# Patient Record
Sex: FEMALE | Race: BLACK OR AFRICAN AMERICAN | HISPANIC OR LATINO | Employment: OTHER | URBAN - METROPOLITAN AREA
[De-identification: names, ages, dates, MRNs, and addresses within clinical notes are randomized per-mention and may not be internally consistent; named-entity substitution may affect disease eponyms.]

---

## 2018-03-27 LAB
ABSOL LYMPHOCYTES (HISTORICAL): 1.9 K/UL (ref 0.5–4)
ALBUMIN SERPL BCP-MCNC: 4.2 G/DL (ref 3–5.2)
ALP SERPL-CCNC: 83 U/L (ref 43–122)
ALT SERPL W P-5'-P-CCNC: 38 U/L (ref 9–52)
ANION GAP SERPL CALCULATED.3IONS-SCNC: 8 MMOL/L (ref 5–14)
AST SERPL W P-5'-P-CCNC: 24 U/L (ref 14–36)
BASOPHILS # BLD AUTO: 0 K/UL (ref 0–0.1)
BASOPHILS # BLD AUTO: 1 % (ref 0–1)
BILIRUB SERPL-MCNC: 0.6 MG/DL
BUN SERPL-MCNC: 14 MG/DL (ref 5–25)
CALCIUM SERPL-MCNC: 10.3 MG/DL (ref 8.4–10.2)
CHLORIDE SERPL-SCNC: 106 MEQ/L (ref 97–108)
CHOLEST SERPL-MCNC: 189 MG/DL
CHOLEST/HDLC SERPL: 3.3 {RATIO}
CO2 SERPL-SCNC: 30 MMOL/L (ref 22–30)
CREATINE, SERUM (HISTORICAL): 0.61 MG/DL (ref 0.6–1.2)
DEPRECATED RDW RBC AUTO: 14 %
EGFR (HISTORICAL): >60 ML/MIN/1.73 M2
EOSINOPHIL # BLD AUTO: 0.1 K/UL (ref 0–0.4)
EOSINOPHIL NFR BLD AUTO: 1 % (ref 0–6)
GLUCOSE SERPL-MCNC: 78 MG/DL (ref 70–99)
HCT VFR BLD AUTO: 44.6 % (ref 36–46)
HDLC SERPL-MCNC: 57 MG/DL
HGB BLD-MCNC: 14.9 G/DL (ref 12–16)
LDL/HDL RATIO (HISTORICAL): 2.1
LDLC SERPL CALC-MCNC: 120 MG/DL
LYMPHOCYTES NFR BLD AUTO: 29 % (ref 25–45)
MCH RBC QN AUTO: 32.6 PG (ref 26–34)
MCHC RBC AUTO-ENTMCNC: 33.3 % (ref 31–36)
MCV RBC AUTO: 98 FL (ref 80–100)
MONOCYTES # BLD AUTO: 0.3 K/UL (ref 0.2–0.9)
MONOCYTES NFR BLD AUTO: 4 % (ref 1–10)
NEUTROPHILS ABS COUNT (HISTORICAL): 4.4 K/UL (ref 1.8–7.8)
NEUTS SEG NFR BLD AUTO: 65 % (ref 45–65)
PLATELET # BLD AUTO: 219 K/MCL (ref 150–450)
POTASSIUM SERPL-SCNC: 4.5 MEQ/L (ref 3.6–5)
RBC # BLD AUTO: 4.56 M/MCL (ref 4–5.2)
SODIUM SERPL-SCNC: 144 MEQ/L (ref 137–147)
TOTAL PROTEIN (HISTORICAL): 7 G/DL (ref 5.9–8.4)
TRIGL SERPL-MCNC: 60 MG/DL
TSH SERPL DL<=0.05 MIU/L-ACNC: 0.94 UIU/ML (ref 0.47–4.68)
VLDLC SERPL CALC-MCNC: 12 MG/DL (ref 0–40)
WBC # BLD AUTO: 6.8 K/MCL (ref 4.5–11)

## 2018-03-28 LAB
HEPATITIS A IGM ANTIBODY (HISTORICAL): NORMAL
HEPATITIS B CORE IGM ANTIBODY (HISTORICAL): NORMAL
HEPATITIS B SURFACE AG CONFIRMATION (HISTORICAL): NORMAL
HEPATITIS C ANTIBODY (HISTORICAL): NORMAL

## 2020-01-01 ENCOUNTER — HOSPITAL ENCOUNTER (EMERGENCY)
Facility: HOSPITAL | Age: 55
Discharge: HOME | End: 2020-01-01
Attending: EMERGENCY MEDICINE | Admitting: EMERGENCY MEDICINE
Payer: MEDICARE

## 2020-01-01 VITALS
HEART RATE: 66 BPM | RESPIRATION RATE: 16 BRPM | SYSTOLIC BLOOD PRESSURE: 123 MMHG | TEMPERATURE: 96.6 F | DIASTOLIC BLOOD PRESSURE: 81 MMHG | WEIGHT: 146.8 LBS | OXYGEN SATURATION: 100 %

## 2020-01-01 DIAGNOSIS — R51.9 HEADACHE: Primary | ICD-10-CM

## 2020-01-01 DIAGNOSIS — J34.89 SINUS PAIN: ICD-10-CM

## 2020-01-01 LAB
BACTERIA UR QL AUTO: ABNORMAL /HPF
BILIRUB UR QL STRIP: NEGATIVE
CLARITY UR: CLEAR
COLOR UR: ABNORMAL
EXT PREG TEST URINE: NEGATIVE
EXT. CONTROL ED NAV: NORMAL
GLUCOSE UR STRIP-MCNC: NEGATIVE MG/DL
HGB UR QL STRIP.AUTO: 250
KETONES UR STRIP-MCNC: NEGATIVE MG/DL
LEUKOCYTE ESTERASE UR QL STRIP: NEGATIVE
NITRITE UR QL STRIP: NEGATIVE
NON-SQ EPI CELLS URNS QL MICRO: ABNORMAL /HPF
PH UR STRIP.AUTO: 7 [PH]
PROT UR STRIP-MCNC: NEGATIVE MG/DL
RBC #/AREA URNS AUTO: ABNORMAL /HPF
SP GR UR STRIP.AUTO: 1 (ref 1–1.04)
UROBILINOGEN UA: NEGATIVE MG/DL
WBC #/AREA URNS AUTO: ABNORMAL /HPF

## 2020-01-01 PROCEDURE — 96374 THER/PROPH/DIAG INJ IV PUSH: CPT

## 2020-01-01 PROCEDURE — 93005 ELECTROCARDIOGRAM TRACING: CPT

## 2020-01-01 PROCEDURE — 81025 URINE PREGNANCY TEST: CPT | Performed by: EMERGENCY MEDICINE

## 2020-01-01 PROCEDURE — 81001 URINALYSIS AUTO W/SCOPE: CPT | Performed by: EMERGENCY MEDICINE

## 2020-01-01 PROCEDURE — 96375 TX/PRO/DX INJ NEW DRUG ADDON: CPT

## 2020-01-01 PROCEDURE — 99284 EMERGENCY DEPT VISIT MOD MDM: CPT | Performed by: EMERGENCY MEDICINE

## 2020-01-01 PROCEDURE — 96361 HYDRATE IV INFUSION ADD-ON: CPT

## 2020-01-01 PROCEDURE — 99283 EMERGENCY DEPT VISIT LOW MDM: CPT

## 2020-01-01 RX ORDER — KETOROLAC TROMETHAMINE 30 MG/ML
30 INJECTION, SOLUTION INTRAMUSCULAR; INTRAVENOUS ONCE
Status: COMPLETED | OUTPATIENT
Start: 2020-01-01 | End: 2020-01-01

## 2020-01-01 RX ORDER — DIPHENHYDRAMINE HYDROCHLORIDE 50 MG/ML
25 INJECTION INTRAMUSCULAR; INTRAVENOUS ONCE
Status: COMPLETED | OUTPATIENT
Start: 2020-01-01 | End: 2020-01-01

## 2020-01-01 RX ORDER — FLUTICASONE PROPIONATE 50 MCG
1 SPRAY, SUSPENSION (ML) NASAL DAILY
Qty: 16 G | Refills: 0 | Status: SHIPPED | OUTPATIENT
Start: 2020-01-01

## 2020-01-01 RX ORDER — METOCLOPRAMIDE HYDROCHLORIDE 5 MG/ML
10 INJECTION INTRAMUSCULAR; INTRAVENOUS ONCE
Status: COMPLETED | OUTPATIENT
Start: 2020-01-01 | End: 2020-01-01

## 2020-01-01 RX ADMIN — SODIUM CHLORIDE 1000 ML: 0.9 INJECTION, SOLUTION INTRAVENOUS at 11:53

## 2020-01-01 RX ADMIN — KETOROLAC TROMETHAMINE 30 MG: 30 INJECTION, SOLUTION INTRAMUSCULAR; INTRAVENOUS at 11:51

## 2020-01-01 RX ADMIN — DIPHENHYDRAMINE HYDROCHLORIDE 25 MG: 50 INJECTION INTRAMUSCULAR; INTRAVENOUS at 11:51

## 2020-01-01 RX ADMIN — METOCLOPRAMIDE 10 MG: 5 INJECTION, SOLUTION INTRAMUSCULAR; INTRAVENOUS at 11:51

## 2020-01-01 NOTE — ED PROVIDER NOTES
History  Chief Complaint   Patient presents with    Headache     Patient reports pain and pressure in her head for 3 days     66-year-old female presents with complaint of a global headache  She reports that over the past 2-3 days she has had fluctuating symptoms  She had been feeling well up to at point reports that the change in weather, sinus congestion/pain along with headache  She is experience photophobia and phonophobia along with mild nausea  She denies any fever/chills, focal neurological deficits, or other acute medical concerns  She is noted to be hypertension on arrival but denies a past history of this  Headache   Pain location:  Generalized  Quality:  Dull  Radiates to:  Does not radiate  Onset quality:  Gradual  Timing:  Constant  Chronicity:  Recurrent  Similar to prior headaches: yes    Relieved by:  Nothing  Worsened by: Activity, light and sound  Ineffective treatments:  None tried  Associated symptoms: congestion, nausea and sinus pressure    Associated symptoms: no abdominal pain, no back pain, no blurred vision, no cough, no diarrhea, no dizziness, no drainage, no ear pain, no eye pain, no facial pain, no fatigue, no fever, no focal weakness, no hearing loss, no loss of balance, no myalgias, no near-syncope, no neck pain, no neck stiffness, no numbness, no paresthesias, no photophobia, no seizures, no sore throat, no swollen glands, no syncope, no tingling, no URI, no visual change, no vomiting and no weakness        None       Past Medical History:   Diagnosis Date    Psychiatric disorder        Past Surgical History:   Procedure Laterality Date     SECTION         History reviewed  No pertinent family history  I have reviewed and agree with the history as documented      Social History     Tobacco Use    Smoking status: Current Every Day Smoker     Packs/day: 1 00     Types: Cigarettes    Smokeless tobacco: Never Used   Substance Use Topics    Alcohol use: Not Currently    Drug use: Yes     Types: Marijuana     Comment: daily        Review of Systems   Constitutional: Negative for appetite change, chills, fatigue and fever  HENT: Positive for congestion, sinus pressure and sinus pain  Negative for ear pain, hearing loss, postnasal drip, sore throat and trouble swallowing  Eyes: Negative for blurred vision, photophobia, pain, redness and itching  Respiratory: Negative for cough, chest tightness, shortness of breath and wheezing  Cardiovascular: Negative for chest pain, leg swelling, syncope and near-syncope  Gastrointestinal: Positive for nausea  Negative for abdominal pain, constipation, diarrhea and vomiting  Endocrine: Negative  Genitourinary: Negative for difficulty urinating and dysuria  Musculoskeletal: Negative for back pain, myalgias, neck pain and neck stiffness  Skin: Negative for rash  Allergic/Immunologic: Negative  Neurological: Positive for headaches  Negative for dizziness, focal weakness, seizures, weakness, numbness, paresthesias and loss of balance  Hematological: Negative  Psychiatric/Behavioral: Negative  Physical Exam  Physical Exam   Constitutional: She is oriented to person, place, and time  She appears well-developed and well-nourished  HENT:   Head: Normocephalic and atraumatic  Right Ear: Tympanic membrane and external ear normal    Left Ear: Tympanic membrane and external ear normal    Nose: Mucosal edema present  No rhinorrhea  Mouth/Throat: Uvula is midline, oropharynx is clear and moist and mucous membranes are normal  No tonsillar exudate  Eyes: Pupils are equal, round, and reactive to light  Conjunctivae and EOM are normal    Neck: Normal range of motion  Neck supple  Cardiovascular: Normal rate, regular rhythm and normal heart sounds  Pulmonary/Chest: Effort normal and breath sounds normal  No respiratory distress  She has no wheezes  Abdominal: Soft   Bowel sounds are normal  There is no tenderness  There is no guarding  Musculoskeletal: She exhibits no edema, tenderness or deformity  Neurological: She is alert and oriented to person, place, and time  Skin: Skin is warm and dry  Capillary refill takes less than 2 seconds  No rash noted  Psychiatric: She has a normal mood and affect  Her behavior is normal    Nursing note and vitals reviewed        Vital Signs  ED Triage Vitals [01/01/20 1115]   Temperature Pulse Respirations Blood Pressure SpO2   (!) 96 6 °F (35 9 °C) 78 16 161/99 100 %      Temp Source Heart Rate Source Patient Position - Orthostatic VS BP Location FiO2 (%)   Tympanic Monitor Sitting Left arm --      Pain Score       --           Vitals:    01/01/20 1115   BP: 161/99   Pulse: 78   Patient Position - Orthostatic VS: Sitting         Visual Acuity      ED Medications  Medications   sodium chloride 0 9 % bolus 1,000 mL (1,000 mL Intravenous New Bag 1/1/20 1153)   ketorolac (TORADOL) injection 30 mg (30 mg Intravenous Given 1/1/20 1151)   metoclopramide (REGLAN) injection 10 mg (10 mg Intravenous Given 1/1/20 1151)   diphenhydrAMINE (BENADRYL) injection 25 mg (25 mg Intravenous Given 1/1/20 1151)       Diagnostic Studies  Results Reviewed     Procedure Component Value Units Date/Time    Urine Microscopic [[id number]]  (Abnormal) Collected:  01/01/20 1140    Lab Status:  Final result Specimen:  Urine, Clean Catch Updated:  01/01/20 1226     RBC, UA 4-10 /hpf      WBC, UA None Seen /hpf      Epithelial Cells Occasional /hpf      Bacteria, UA None Seen /hpf     UA (URINE) with reflex to Scope [[id number]]  (Abnormal) Collected:  01/01/20 1140    Lab Status:  Final result Specimen:  Urine, Clean Catch Updated:  01/01/20 1217     Color, UA Straw     Clarity, UA Clear     Specific [place], UA 1 005     pH, UA 7 0     Leukocytes, UA Negative     Nitrite, UA Negative     Protein, UA Negative mg/dl      Glucose, UA Negative mg/dl      Ketones, UA Negative mg/dl      Bilirubin, UA Negative Blood,  0     UROBILINOGEN UA Negative mg/dL     POCT pregnancy, urine [[id number]]  (Normal) Resulted:  01/01/20 1145    Lab Status:  Final result Updated:  01/01/20 1145     EXT PREG TEST UR (Ref: Negative) negative     Control valid                 No orders to display              Procedures  ECG 12 Lead Documentation Only  Date/Time: 1/1/2020 12:14 PM  Performed by: Betty Roper DO  Authorized by: Betty Roper DO     ECG reviewed by me, the ED Provider: yes    Patient location:  ED  Rate:     ECG rate:  62    ECG rate assessment: normal    Rhythm:     Rhythm: sinus rhythm    Ectopy:     Ectopy: none    QRS:     QRS axis:  Normal  Conduction:     Conduction: normal    ST segments:     ST segments:  Normal  T waves:     T waves: normal               ED Course                               MDM  Number of Diagnoses or Management Options  Headache:   Sinus pain:   Diagnosis management comments: 54-year-old female presents with complaint of headache and sinus pain/pressure  On exam are no focal neurological deficits or other acute issues or concerns  After receiving IV fluids and medications, the patient's pain symptoms almost completely resolved  Discussed supportive care measures, treatment plan, reasons for follow-up, and reasons to return to the ER  Amount and/or Complexity of Data Reviewed  Clinical lab tests: ordered and reviewed  Tests in the medicine section of CPT®: ordered and reviewed          Disposition  Final diagnoses:   Headache   Sinus pain     Time reflects when diagnosis was documented in both MDM as applicable and the Disposition within this note     Time User Action Codes Description Comment    1/1/2020 12:33 PM Russ Loza Add [R51] Headache     1/1/2020 12:33 PM Russ Loza Add [J34 89] Sinus pain       ED Disposition     ED Disposition Condition Date/Time Comment    Discharge Stable Wed Jan 1, 2020 12:33 PM Mel Pisano discharge to home/self care  Follow-up Information    None         Patient's Medications   Discharge Prescriptions    FLUTICASONE (FLONASE) 50 MCG/ACT NASAL SPRAY    1 spray into each nostril daily       Start Date: 1/1/2020  End Date: --       Order Dose: 1 spray       Quantity: 16 g    Refills: 0     No discharge procedures on file      ED Provider  Electronically Signed by           Dell Grant DO  01/01/20 1249

## 2020-01-02 LAB
ATRIAL RATE: 62 BPM
P AXIS: 67 DEGREES
PR INTERVAL: 176 MS
QRS AXIS: 65 DEGREES
QRSD INTERVAL: 82 MS
QT INTERVAL: 404 MS
QTC INTERVAL: 410 MS
T WAVE AXIS: 60 DEGREES
VENTRICULAR RATE: 62 BPM

## 2020-01-02 PROCEDURE — 93010 ELECTROCARDIOGRAM REPORT: CPT | Performed by: INTERNAL MEDICINE

## 2021-08-05 ENCOUNTER — NURSE TRIAGE (OUTPATIENT)
Dept: OTHER | Facility: OTHER | Age: 56
End: 2021-08-05

## 2021-08-05 NOTE — TELEPHONE ENCOUNTER
Patient given information for vaccination  Reason for Disposition   COVID-19 vaccine, Frequently Asked Questions (FAQs)    Answer Assessment - Initial Assessment Questions  1  MAIN CONCERN OR SYMPTOM:  "What is your main concern right now?" "What question do you have?" "What's the main symptom you're worried about?" (e g , fever, pain, redness, swelling)      Patient does not know if she should get vaccinated  2  VACCINE: "What vaccination did you receive?" "Is this your first or second shot?" (e g , none; AstraZeneca, J&J, [employer] [place], Mariano Aquino, other)      Unsure  3  SYMPTOM ONSET: "When did the None begin?" (e g , not relevant; hours, days)       None  4  SYMPTOM SEVERITY: "How bad is it?"       None  5  FEVER: "Is there a fever?" If Yes, ask: "What is it, how was it measured, and when did it start?"       None  6  PAST REACTIONS: "Have you reacted to immunizations before?" If Yes, ask: "What happened?"      None  7   OTHER SYMPTOMS: "Do you have any other symptoms?"      None    Protocols used: CORONAVIRUS (COVID-19) VACCINE QUESTIONS AND REACTIONS-ADULT-

## 2021-10-07 ENCOUNTER — OFFICE VISIT (OUTPATIENT)
Dept: FAMILY MEDICINE CLINIC | Facility: CLINIC | Age: 56
End: 2021-10-07

## 2021-10-07 VITALS
HEART RATE: 86 BPM | WEIGHT: 150 LBS | BODY MASS INDEX: 25.61 KG/M2 | HEIGHT: 64 IN | RESPIRATION RATE: 19 BRPM | DIASTOLIC BLOOD PRESSURE: 80 MMHG | TEMPERATURE: 97.6 F | SYSTOLIC BLOOD PRESSURE: 126 MMHG | OXYGEN SATURATION: 98 %

## 2021-10-07 DIAGNOSIS — F41.9 ANXIETY: ICD-10-CM

## 2021-10-07 DIAGNOSIS — Z12.31 ENCOUNTER FOR SCREENING MAMMOGRAM FOR MALIGNANT NEOPLASM OF BREAST: ICD-10-CM

## 2021-10-07 DIAGNOSIS — Z72.0 TOBACCO ABUSE: ICD-10-CM

## 2021-10-07 DIAGNOSIS — Z23 NEED FOR VACCINATION: ICD-10-CM

## 2021-10-07 DIAGNOSIS — Z76.89 ENCOUNTER TO ESTABLISH CARE: Primary | ICD-10-CM

## 2021-10-07 DIAGNOSIS — R35.0 URINARY FREQUENCY: ICD-10-CM

## 2021-10-07 DIAGNOSIS — R35.0 URINE FREQUENCY: ICD-10-CM

## 2021-10-07 DIAGNOSIS — H61.23 BILATERAL IMPACTED CERUMEN: ICD-10-CM

## 2021-10-07 DIAGNOSIS — Z12.11 COLON CANCER SCREENING: ICD-10-CM

## 2021-10-07 LAB
SL AMB  POCT GLUCOSE, UA: 250
SL AMB LEUKOCYTE ESTERASE,UA: NEGATIVE
SL AMB POCT BILIRUBIN,UA: NEGATIVE
SL AMB POCT BLOOD,UA: NEGATIVE
SL AMB POCT CLARITY,UA: CLEAR
SL AMB POCT COLOR,UA: YELLOW
SL AMB POCT KETONES,UA: NEGATIVE
SL AMB POCT NITRITE,UA: NEGATIVE
SL AMB POCT PH,UA: 7
SL AMB POCT SPECIFIC GRAVITY,UA: 1
SL AMB POCT URINE PROTEIN: NEGATIVE
SL AMB POCT UROBILINOGEN: NORMAL

## 2021-10-07 PROCEDURE — 81002 URINALYSIS NONAUTO W/O SCOPE: CPT | Performed by: FAMILY MEDICINE

## 2021-10-07 PROCEDURE — 69210 REMOVE IMPACTED EAR WAX UNI: CPT | Performed by: FAMILY MEDICINE

## 2021-10-07 PROCEDURE — 99213 OFFICE O/P EST LOW 20 MIN: CPT | Performed by: FAMILY MEDICINE

## 2021-10-15 ENCOUNTER — HOSPITAL ENCOUNTER (OUTPATIENT)
Dept: MAMMOGRAPHY | Facility: CLINIC | Age: 56
Discharge: HOME | End: 2021-10-15
Payer: MEDICARE

## 2021-10-15 DIAGNOSIS — Z12.31 ENCOUNTER FOR SCREENING MAMMOGRAM FOR MALIGNANT NEOPLASM OF BREAST: ICD-10-CM

## 2021-10-15 PROCEDURE — 77063 BREAST TOMOSYNTHESIS BI: CPT

## 2021-10-15 PROCEDURE — 77067 SCR MAMMO BI INCL CAD: CPT

## 2021-10-26 ENCOUNTER — PREP FOR PROCEDURE (OUTPATIENT)
Dept: SURGERY | Facility: CLINIC | Age: 56
End: 2021-10-26

## 2021-10-26 ENCOUNTER — CONSULT (OUTPATIENT)
Dept: SURGERY | Facility: CLINIC | Age: 56
End: 2021-10-26
Payer: MEDICARE

## 2021-10-26 VITALS
DIASTOLIC BLOOD PRESSURE: 80 MMHG | BODY MASS INDEX: 25.61 KG/M2 | WEIGHT: 150 LBS | HEIGHT: 64 IN | HEART RATE: 80 BPM | SYSTOLIC BLOOD PRESSURE: 128 MMHG | TEMPERATURE: 97.8 F

## 2021-10-26 DIAGNOSIS — Z12.11 COLON CANCER SCREENING: ICD-10-CM

## 2021-10-26 DIAGNOSIS — Z12.11 ENCOUNTER FOR SCREENING COLONOSCOPY: Primary | ICD-10-CM

## 2021-10-26 PROCEDURE — 99204 OFFICE O/P NEW MOD 45 MIN: CPT | Performed by: SURGERY

## 2021-10-26 RX ORDER — CLONAZEPAM 1 MG/1
TABLET ORAL
COMMUNITY

## 2021-10-26 RX ORDER — IBUPROFEN 200 MG
TABLET ORAL
COMMUNITY
End: 2021-11-04

## 2021-10-26 RX ORDER — SODIUM PICOSULFATE, MAGNESIUM OXIDE, AND ANHYDROUS CITRIC ACID 10; 3.5; 12 MG/160ML; G/160ML; G/160ML
LIQUID ORAL
COMMUNITY
Start: 2021-06-22 | End: 2021-11-24 | Stop reason: HOSPADM

## 2021-10-26 RX ORDER — ALPRAZOLAM 0.5 MG/1
TABLET ORAL
COMMUNITY

## 2021-10-27 ENCOUNTER — HOSPITAL ENCOUNTER (OUTPATIENT)
Dept: ULTRASOUND IMAGING | Facility: CLINIC | Age: 56
Discharge: HOME | End: 2021-10-27
Payer: MEDICARE

## 2021-10-27 ENCOUNTER — HOSPITAL ENCOUNTER (OUTPATIENT)
Dept: MAMMOGRAPHY | Facility: CLINIC | Age: 56
Discharge: HOME | End: 2021-10-27
Payer: MEDICARE

## 2021-10-27 DIAGNOSIS — R92.8 ABNORMAL SCREENING MAMMOGRAM: ICD-10-CM

## 2021-10-27 PROCEDURE — G0279 TOMOSYNTHESIS, MAMMO: HCPCS

## 2021-10-27 PROCEDURE — 77065 DX MAMMO INCL CAD UNI: CPT

## 2021-10-27 PROCEDURE — 76642 ULTRASOUND BREAST LIMITED: CPT

## 2021-11-01 ENCOUNTER — TELEPHONE (OUTPATIENT)
Dept: PREADMISSION TESTING | Facility: HOSPITAL | Age: 56
End: 2021-11-01

## 2021-11-04 ENCOUNTER — ANNUAL EXAM (OUTPATIENT)
Dept: FAMILY MEDICINE CLINIC | Facility: CLINIC | Age: 56
End: 2021-11-04

## 2021-11-04 VITALS
SYSTOLIC BLOOD PRESSURE: 130 MMHG | RESPIRATION RATE: 18 BRPM | OXYGEN SATURATION: 98 % | DIASTOLIC BLOOD PRESSURE: 82 MMHG | HEIGHT: 64 IN | BODY MASS INDEX: 26.61 KG/M2 | HEART RATE: 85 BPM | WEIGHT: 155.9 LBS | TEMPERATURE: 98 F

## 2021-11-04 DIAGNOSIS — Z12.4 CERVICAL CANCER SCREENING: ICD-10-CM

## 2021-11-04 DIAGNOSIS — Z01.419 WOMEN'S ANNUAL ROUTINE GYNECOLOGICAL EXAMINATION: Primary | ICD-10-CM

## 2021-11-04 DIAGNOSIS — M54.50 ACUTE LEFT-SIDED LOW BACK PAIN WITHOUT SCIATICA: ICD-10-CM

## 2021-11-04 PROCEDURE — G0101 CA SCREEN;PELVIC/BREAST EXAM: HCPCS | Performed by: FAMILY MEDICINE

## 2021-11-04 PROCEDURE — G0476 HPV COMBO ASSAY CA SCREEN: HCPCS | Performed by: FAMILY MEDICINE

## 2021-11-04 PROCEDURE — G0145 SCR C/V CYTO,THINLAYER,RESCR: HCPCS | Performed by: FAMILY MEDICINE

## 2021-11-04 RX ORDER — IBUPROFEN 400 MG/1
400 TABLET ORAL EVERY 6 HOURS PRN
Qty: 30 TABLET | Refills: 1 | Status: SHIPPED | OUTPATIENT
Start: 2021-11-04 | End: 2022-02-08 | Stop reason: SDUPTHER

## 2021-11-06 LAB
HPV HR 12 DNA CVX QL NAA+PROBE: NEGATIVE
HPV16 DNA CVX QL NAA+PROBE: NEGATIVE
HPV18 DNA CVX QL NAA+PROBE: NEGATIVE

## 2021-11-08 ENCOUNTER — PATIENT OUTREACH (OUTPATIENT)
Dept: FAMILY MEDICINE CLINIC | Facility: CLINIC | Age: 56
End: 2021-11-08

## 2021-11-11 LAB
LAB AP GYN PRIMARY INTERPRETATION: NORMAL
Lab: NORMAL

## 2021-11-24 ENCOUNTER — ANESTHESIA (OUTPATIENT)
Dept: GASTROENTEROLOGY | Facility: HOSPITAL | Age: 56
End: 2021-11-24

## 2021-11-24 ENCOUNTER — HOSPITAL ENCOUNTER (OUTPATIENT)
Dept: GASTROENTEROLOGY | Facility: HOSPITAL | Age: 56
Setting detail: OUTPATIENT SURGERY
Discharge: HOME | End: 2021-11-24
Attending: SURGERY | Admitting: SURGERY
Payer: MEDICARE

## 2021-11-24 ENCOUNTER — ANESTHESIA EVENT (OUTPATIENT)
Dept: GASTROENTEROLOGY | Facility: HOSPITAL | Age: 56
End: 2021-11-24

## 2021-11-24 VITALS
TEMPERATURE: 96.1 F | OXYGEN SATURATION: 98 % | WEIGHT: 155 LBS | BODY MASS INDEX: 26.46 KG/M2 | RESPIRATION RATE: 18 BRPM | DIASTOLIC BLOOD PRESSURE: 79 MMHG | SYSTOLIC BLOOD PRESSURE: 130 MMHG | HEIGHT: 64 IN | HEART RATE: 63 BPM

## 2021-11-24 DIAGNOSIS — Z12.11 ENCOUNTER FOR SCREENING COLONOSCOPY: ICD-10-CM

## 2021-11-24 PROBLEM — F17.200 SMOKING: Status: ACTIVE | Noted: 2021-10-07

## 2021-11-24 PROCEDURE — G0121 COLON CA SCRN NOT HI RSK IND: HCPCS | Performed by: SURGERY

## 2021-11-24 RX ORDER — SODIUM CHLORIDE 9 MG/ML
INJECTION, SOLUTION INTRAVENOUS CONTINUOUS PRN
Status: DISCONTINUED | OUTPATIENT
Start: 2021-11-24 | End: 2021-11-24

## 2021-11-24 RX ORDER — PROPOFOL 10 MG/ML
INJECTION, EMULSION INTRAVENOUS AS NEEDED
Status: DISCONTINUED | OUTPATIENT
Start: 2021-11-24 | End: 2021-11-24

## 2021-11-24 RX ORDER — SODIUM CHLORIDE 9 MG/ML
50 INJECTION, SOLUTION INTRAVENOUS CONTINUOUS
Status: DISCONTINUED | OUTPATIENT
Start: 2021-11-24 | End: 2021-11-28 | Stop reason: HOSPADM

## 2021-11-24 RX ORDER — LIDOCAINE HYDROCHLORIDE 10 MG/ML
INJECTION, SOLUTION EPIDURAL; INFILTRATION; INTRACAUDAL; PERINEURAL AS NEEDED
Status: DISCONTINUED | OUTPATIENT
Start: 2021-11-24 | End: 2021-11-24

## 2021-11-24 RX ADMIN — PROPOFOL 100 MG: 10 INJECTION, EMULSION INTRAVENOUS at 10:27

## 2021-11-24 RX ADMIN — SODIUM CHLORIDE: 0.9 INJECTION, SOLUTION INTRAVENOUS at 10:36

## 2021-11-24 RX ADMIN — SODIUM CHLORIDE 50 ML/HR: 0.9 INJECTION, SOLUTION INTRAVENOUS at 09:15

## 2021-11-24 RX ADMIN — SODIUM CHLORIDE: 0.9 INJECTION, SOLUTION INTRAVENOUS at 10:16

## 2021-11-24 RX ADMIN — SODIUM CHLORIDE: 0.9 INJECTION, SOLUTION INTRAVENOUS at 10:40

## 2021-11-24 RX ADMIN — PROPOFOL 100 MG: 10 INJECTION, EMULSION INTRAVENOUS at 10:23

## 2021-11-24 RX ADMIN — LIDOCAINE HYDROCHLORIDE 50 MG: 10 INJECTION, SOLUTION EPIDURAL; INFILTRATION; INTRACAUDAL; PERINEURAL at 10:23

## 2021-12-07 ENCOUNTER — TELEPHONE (OUTPATIENT)
Dept: SURGERY | Facility: CLINIC | Age: 56
End: 2021-12-07

## 2022-01-07 ENCOUNTER — HOSPITAL ENCOUNTER (OUTPATIENT)
Dept: RADIOLOGY | Facility: HOSPITAL | Age: 57
Discharge: HOME | End: 2022-01-07
Payer: COMMERCIAL

## 2022-01-07 ENCOUNTER — OFFICE VISIT (OUTPATIENT)
Dept: FAMILY MEDICINE CLINIC | Facility: CLINIC | Age: 57
End: 2022-01-07

## 2022-01-07 VITALS
TEMPERATURE: 97.6 F | RESPIRATION RATE: 18 BRPM | WEIGHT: 155 LBS | HEART RATE: 88 BPM | BODY MASS INDEX: 26.46 KG/M2 | HEIGHT: 64 IN | SYSTOLIC BLOOD PRESSURE: 118 MMHG | DIASTOLIC BLOOD PRESSURE: 78 MMHG | OXYGEN SATURATION: 97 %

## 2022-01-07 DIAGNOSIS — M25.561 PAIN IN BOTH KNEES, UNSPECIFIED CHRONICITY: Primary | ICD-10-CM

## 2022-01-07 DIAGNOSIS — M25.562 PAIN IN BOTH KNEES, UNSPECIFIED CHRONICITY: ICD-10-CM

## 2022-01-07 DIAGNOSIS — F41.9 ANXIETY: ICD-10-CM

## 2022-01-07 DIAGNOSIS — M25.562 PAIN IN BOTH KNEES, UNSPECIFIED CHRONICITY: Primary | ICD-10-CM

## 2022-01-07 DIAGNOSIS — M25.561 PAIN IN BOTH KNEES, UNSPECIFIED CHRONICITY: ICD-10-CM

## 2022-01-07 PROCEDURE — 73610 X-RAY EXAM OF ANKLE: CPT

## 2022-01-07 PROCEDURE — 73562 X-RAY EXAM OF KNEE 3: CPT

## 2022-01-07 PROCEDURE — 99213 OFFICE O/P EST LOW 20 MIN: CPT | Performed by: FAMILY MEDICINE

## 2022-01-07 PROCEDURE — 3008F BODY MASS INDEX DOCD: CPT | Performed by: FAMILY MEDICINE

## 2022-01-07 RX ORDER — SENNOSIDES 8.6 MG
650 CAPSULE ORAL EVERY 12 HOURS
Qty: 30 TABLET | Refills: 0 | Status: SHIPPED | OUTPATIENT
Start: 2022-01-07

## 2022-01-07 RX ORDER — HYDROXYZINE HYDROCHLORIDE 25 MG/1
25 TABLET, FILM COATED ORAL EVERY 6 HOURS PRN
Qty: 30 TABLET | Refills: 1 | Status: SHIPPED | OUTPATIENT
Start: 2022-01-07 | End: 2022-02-08 | Stop reason: SDUPTHER

## 2022-01-07 NOTE — ASSESSMENT & PLAN NOTE
Not erythematous or hot thereby making infectious etiology less likely  No swelling present on palpation  Additionally gout less likely  This is most likely related to arthritic change  Plan     -acetaminophen 1300mg q 12   -  Will obtain plain films of bilateral knee  -  Consulted physical therapy at this time    -  Will bring patient back in a few weeks to monitor the status of her symptoms   And go over the results of her x-ray

## 2022-01-07 NOTE — ASSESSMENT & PLAN NOTE
Previously on Xanax  For patient has been reported mild anxiety symptoms  Plan    -  Atarax 25 mg q 6   P r n   - consider  Adjusting need for therapy in the next visit

## 2022-01-07 NOTE — PROGRESS NOTES
Assessment/Plan:    1  Pain in both knees, unspecified chronicity  Assessment & Plan:    Not erythematous or hot thereby making infectious etiology less likely  No swelling present on palpation  Additionally gout less likely  This is most likely related to arthritic change  Plan     -acetaminophen 1300mg q 12   -  Will obtain plain films of bilateral knee  -  Consulted physical therapy at this time  -  Will bring patient back in a few weeks to monitor the status of her symptoms   And go over the results of her x-ray      Orders:  -     XR knee 3 vw left non injury; Future; Expected date: 01/07/2022  -     Ambulatory referral to Physical Therapy; Future  -     acetaminophen (TYLENOL) 650 mg CR tablet; Take 1 tablet (650 mg total) by mouth every 12 (twelve) hours    2  Anxiety  Assessment & Plan:   Previously on Xanax  For patient has been reported mild anxiety symptoms  Plan    -  Atarax 25 mg q 6   P r n   - consider  Adjusting need for therapy in the next visit  Orders:  -     hydrOXYzine HCL (ATARAX) 25 mg tablet; Take 1 tablet (25 mg total) by mouth every 6 (six) hours as needed for itching       Subjective:      Patient ID: Stephanie Mohr is a 64 y o  female  Past medical history significant for anxiety is coming in with chief complaint of bilateral bilateral knee pain as well as left ankle pain  Patient reports that it has been present for months  Patient reports that it is exacerbated with going the stairs  Patient has had trial of NSAIDs however she states that she stopped using them as they caused her to have stomach pain  Patient denies any trauma to the knee area  Patient denies any fevers or chills at this time        The following portions of the patient's history were reviewed and updated as appropriate: allergies, current medications, past family history, past medical history, past social history, past surgical history, and problem list     Review of Systems   Constitutional: Negative for chills and fever  HENT: Negative for ear pain and sore throat  Eyes: Negative for pain and visual disturbance  Respiratory: Negative for cough and shortness of breath  Cardiovascular: Negative for chest pain and palpitations  Gastrointestinal: Negative for abdominal pain and vomiting  Genitourinary: Negative for dysuria and hematuria  Musculoskeletal: Positive for arthralgias (Bilateral knee)  Negative for back pain  Skin: Negative for color change and rash  Neurological: Negative for seizures and syncope  Psychiatric/Behavioral: The patient is nervous/anxious  All other systems reviewed and are negative  Objective:      /78 (BP Location: Left arm, Patient Position: Sitting, Cuff Size: Adult)   Pulse 88   Temp 97 6 °F (36 4 °C) (Temporal)   Resp 18   Ht 5' 4" (1 626 m)   Wt 70 3 kg (155 lb)   SpO2 97%   BMI 26 61 kg/m²          Physical Exam  Constitutional:       General: She is not in acute distress  Appearance: Normal appearance  HENT:      Nose: No congestion or rhinorrhea  Eyes:      Extraocular Movements: Extraocular movements intact  Pupils: Pupils are equal, round, and reactive to light  Cardiovascular:      Rate and Rhythm: Normal rate and regular rhythm  Pulses: Normal pulses  Heart sounds: Normal heart sounds  No murmur heard  No gallop  Pulmonary:      Effort: Pulmonary effort is normal       Breath sounds: Normal breath sounds  No wheezing, rhonchi or rales  Abdominal:      General: Bowel sounds are normal  There is no distension  Palpations: Abdomen is soft  There is no mass  Tenderness: There is no abdominal tenderness  Hernia: No hernia is present  Musculoskeletal:      Right knee: No swelling or deformity  Instability Tests: Anterior drawer test negative  Posterior drawer test negative  Anterior Lachman test negative  Medial Chitra test negative and lateral Chitra test negative  Left knee: No swelling or deformity  Instability Tests: Posterior drawer test negative  Anterior Lachman test negative  Medial Chitra test negative and lateral Chitra test negative  Right ankle: Normal       Right Achilles Tendon: No tenderness or defects  Lagos's test negative  Left ankle: Normal       Left Achilles Tendon: No tenderness or defects  Lagos's test negative  Skin:     General: Skin is warm  Coloration: Skin is not jaundiced  Findings: No bruising  Neurological:      General: No focal deficit present  Mental Status: She is alert and oriented to person, place, and time  Psychiatric:         Mood and Affect: Mood normal          Behavior: Behavior normal          Thought Content:  Thought content normal            Dima Trujillo DO   Family Medicine PGY-1

## 2022-02-08 ENCOUNTER — PROCEDURE VISIT (OUTPATIENT)
Dept: FAMILY MEDICINE CLINIC | Facility: CLINIC | Age: 57
End: 2022-02-08

## 2022-02-08 VITALS
DIASTOLIC BLOOD PRESSURE: 68 MMHG | HEIGHT: 64 IN | WEIGHT: 154.7 LBS | SYSTOLIC BLOOD PRESSURE: 116 MMHG | RESPIRATION RATE: 18 BRPM | OXYGEN SATURATION: 97 % | BODY MASS INDEX: 26.41 KG/M2 | HEART RATE: 85 BPM | TEMPERATURE: 98.7 F

## 2022-02-08 DIAGNOSIS — G89.29 CHRONIC PAIN OF BOTH KNEES: Primary | ICD-10-CM

## 2022-02-08 DIAGNOSIS — M25.561 CHRONIC PAIN OF BOTH KNEES: Primary | ICD-10-CM

## 2022-02-08 DIAGNOSIS — Z00.00 WELLNESS EXAMINATION: ICD-10-CM

## 2022-02-08 DIAGNOSIS — M54.50 ACUTE LEFT-SIDED LOW BACK PAIN WITHOUT SCIATICA: ICD-10-CM

## 2022-02-08 DIAGNOSIS — F41.9 ANXIETY: ICD-10-CM

## 2022-02-08 DIAGNOSIS — M25.562 CHRONIC PAIN OF BOTH KNEES: Primary | ICD-10-CM

## 2022-02-08 PROCEDURE — 99213 OFFICE O/P EST LOW 20 MIN: CPT | Performed by: FAMILY MEDICINE

## 2022-02-08 PROCEDURE — 3008F BODY MASS INDEX DOCD: CPT | Performed by: FAMILY MEDICINE

## 2022-02-08 RX ORDER — HYDROXYZINE HYDROCHLORIDE 25 MG/1
25 TABLET, FILM COATED ORAL EVERY 6 HOURS PRN
Qty: 30 TABLET | Refills: 1 | Status: SHIPPED | OUTPATIENT
Start: 2022-02-08

## 2022-02-08 RX ORDER — IBUPROFEN 400 MG/1
400 TABLET ORAL EVERY 6 HOURS PRN
Qty: 30 TABLET | Refills: 1 | Status: SHIPPED | OUTPATIENT
Start: 2022-02-08

## 2022-02-08 NOTE — ASSESSMENT & PLAN NOTE
Now resolved treatment  Patient instructed to wean off the ibuprofen at this time  Patient instructed to do exercises to help with the pain  Patient declines injection at this time

## 2022-02-08 NOTE — PROGRESS NOTES
Assessment/Plan:    1  Chronic pain of both knees  Assessment & Plan:  Now resolved treatment  Patient instructed to wean off the ibuprofen at this time  Patient instructed to do exercises to help with the pain  Patient declines injection at this time  2  Acute left-sided low back pain without sciatica  Assessment & Plan:  Acute on chronic  Patient denies any trauma physical exam is benign and has no evidence of point midline tenderness  No muscle spasms noted  Patient lower extremity strength is intact  Plan  Instructed patient to exercise on an every-other-day basis  Including stretching her muscles  In addition instructed patient is to utilize the ibuprofen for breakthrough pain  Patient is amendable to the exercise and will revisit this in a couple months  Orders:  -     ibuprofen (MOTRIN) 400 mg tablet; Take 1 tablet (400 mg total) by mouth every 6 (six) hours as needed for mild pain or moderate pain    3  Anxiety  -     hydrOXYzine HCL (ATARAX) 25 mg tablet; Take 1 tablet (25 mg total) by mouth every 6 (six) hours as needed for itching    4  Wellness examination  -     Comprehensive metabolic panel; Future  -     HEMOGLOBIN A1C W/ EAG ESTIMATION; Future  -     CBC and differential; Future       Subjective:      Patient ID: Les Hubbard is a 64 y o  female  Miss Emely Quinn is coming to the office visit for by knee injection of the left knee after she was found to have knee pain in her last visit as well as ankle pain  Patient at that time of the visit was prescribed to take ibuprofen twice a day with Atarax to help with her anxiety and her pain  Patient reports that since taking the medication her pain level went down and  she no longer has pain in her ankle or her knee  Patient states that she takes an ibuprofen once in the morning and and ibuprofen once at night and she says that her pain level is currently at a 0  Patient currently refuses knee injection    She does states that she has back pain without sciatica  It is exacerbated with certain movements  She states that she has not had any exercise or physical activity in a while  She denies any saddle anesthesia, weight loss or weight gain or any trauma done to the area  The following portions of the patient's history were reviewed and updated as appropriate: allergies, current medications, past family history, past medical history, past social history, past surgical history, and problem list     Review of Systems   Constitutional: Negative for chills and fever  HENT: Negative for ear pain and sore throat  Eyes: Negative for pain and visual disturbance  Respiratory: Negative for cough and shortness of breath  Cardiovascular: Negative for chest pain and palpitations  Gastrointestinal: Negative for abdominal pain and vomiting  Genitourinary: Negative for dysuria and hematuria  Musculoskeletal: Positive for arthralgias (back)  Negative for back pain  Skin: Negative for color change and rash  Neurological: Negative for seizures and syncope  Psychiatric/Behavioral: The patient is nervous/anxious  All other systems reviewed and are negative  Objective:      /68 (BP Location: Left arm, Patient Position: Sitting, Cuff Size: Standard)   Pulse 85   Temp 98 7 °F (37 1 °C) (Temporal)   Resp 18   Ht 5' 4" (1 626 m)   Wt 70 2 kg (154 lb 11 2 oz)   SpO2 97%   Breastfeeding No   BMI 26 55 kg/m²          Physical Exam  Constitutional:       General: She is not in acute distress  Appearance: Normal appearance  HENT:      Nose: No congestion or rhinorrhea  Eyes:      Extraocular Movements: Extraocular movements intact  Pupils: Pupils are equal, round, and reactive to light  Cardiovascular:      Rate and Rhythm: Normal rate and regular rhythm  Pulses: Normal pulses  Heart sounds: Normal heart sounds  No murmur heard  No gallop      Pulmonary:      Effort: Pulmonary effort is normal  Breath sounds: Normal breath sounds  No wheezing, rhonchi or rales  Abdominal:      General: Bowel sounds are normal  There is no distension  Palpations: Abdomen is soft  There is no mass  Tenderness: There is no abdominal tenderness  Hernia: No hernia is present  Musculoskeletal:      Right knee: No swelling or deformity  Instability Tests: Anterior drawer test negative  Posterior drawer test negative  Anterior Lachman test negative  Medial Chitra test negative and lateral [place] test negative  Left knee: No swelling or deformity  Instability Tests: Posterior drawer test negative  Anterior Lachman test negative  Medial Chitra test negative and lateral Chitra test negative  Right ankle: Normal       Right Achilles Tendon: No tenderness or defects  Lagos's test negative  Left ankle: Normal       Left Achilles Tendon: No tenderness or defects  Lagos's test negative  Skin:     General: Skin is warm  Coloration: Skin is not jaundiced  Findings: No bruising  Neurological:      General: No focal deficit present  Mental Status: She is alert and oriented to person, place, and time  Psychiatric:         Mood and Affect: Mood normal          Behavior: Behavior normal          Thought Content:  Thought content normal            Willi Silva DO   Family Medicine PGY-1   2/8/2022

## 2022-02-08 NOTE — ASSESSMENT & PLAN NOTE
Acute on chronic  Patient denies any trauma physical exam is benign and has no evidence of point midline tenderness  No muscle spasms noted  Patient lower extremity strength is intact  Plan  Instructed patient to exercise on an every-other-day basis  Including stretching her muscles  In addition instructed patient is to utilize the ibuprofen for breakthrough pain  Patient is amendable to the exercise and will revisit this in a couple months

## 2022-04-28 ENCOUNTER — HOSPITAL ENCOUNTER (OUTPATIENT)
Dept: MAMMOGRAPHY | Facility: CLINIC | Age: 57
Discharge: HOME | End: 2022-04-28
Payer: COMMERCIAL

## 2022-04-28 VITALS — WEIGHT: 154 LBS | HEIGHT: 64 IN | BODY MASS INDEX: 26.29 KG/M2

## 2022-04-28 DIAGNOSIS — Z12.31 ENCOUNTER FOR SCREENING MAMMOGRAM FOR MALIGNANT NEOPLASM OF BREAST: ICD-10-CM

## 2022-04-28 DIAGNOSIS — R92.8 OTHER ABNORMAL AND INCONCLUSIVE FINDINGS ON DIAGNOSTIC IMAGING OF BREAST: ICD-10-CM

## 2022-04-28 PROCEDURE — G0279 TOMOSYNTHESIS, MAMMO: HCPCS

## 2022-04-28 PROCEDURE — 76642 ULTRASOUND BREAST LIMITED: CPT

## 2022-04-28 PROCEDURE — 77065 DX MAMMO INCL CAD UNI: CPT

## 2022-05-11 ENCOUNTER — APPOINTMENT (OUTPATIENT)
Dept: LAB | Facility: HOSPITAL | Age: 57
End: 2022-05-11
Payer: COMMERCIAL

## 2022-05-11 DIAGNOSIS — Z00.00 WELLNESS EXAMINATION: ICD-10-CM

## 2022-05-11 LAB
ALBUMIN SERPL BCP-MCNC: 4.1 G/DL (ref 3–5.2)
ALP SERPL-CCNC: 93 U/L (ref 43–122)
ALT SERPL W P-5'-P-CCNC: 28 U/L
ANION GAP SERPL CALCULATED.3IONS-SCNC: 4 MMOL/L (ref 5–14)
AST SERPL W P-5'-P-CCNC: 27 U/L (ref 14–36)
BACTERIA UR QL AUTO: ABNORMAL /HPF
BASOPHILS # BLD AUTO: 0.03 THOUSANDS/ΜL (ref 0–0.1)
BASOPHILS NFR BLD AUTO: 0 % (ref 0–1)
BILIRUB SERPL-MCNC: 0.74 MG/DL
BILIRUB UR QL STRIP: NEGATIVE
BUN SERPL-MCNC: 14 MG/DL (ref 5–25)
CALCIUM SERPL-MCNC: 9.7 MG/DL (ref 8.4–10.2)
CHLORIDE SERPL-SCNC: 108 MMOL/L (ref 97–108)
CLARITY UR: CLEAR
CO2 SERPL-SCNC: 29 MMOL/L (ref 22–30)
COLOR UR: YELLOW
CREAT SERPL-MCNC: 0.56 MG/DL (ref 0.6–1.2)
EOSINOPHIL # BLD AUTO: 0.05 THOUSAND/ΜL (ref 0–0.61)
EOSINOPHIL NFR BLD AUTO: 1 % (ref 0–6)
ERYTHROCYTE [DISTWIDTH] IN BLOOD BY AUTOMATED COUNT: 13.7 % (ref 11.6–15.1)
GFR SERPL CREATININE-BSD FRML MDRD: 104 ML/MIN/1.73SQ M
GLUCOSE P FAST SERPL-MCNC: 94 MG/DL (ref 70–99)
GLUCOSE UR STRIP-MCNC: NEGATIVE MG/DL
HCT VFR BLD AUTO: 45 % (ref 34.8–46.1)
HGB BLD-MCNC: 14 G/DL (ref 11.5–15.4)
HGB UR QL STRIP.AUTO: 250
HYALINE CASTS #/AREA URNS LPF: ABNORMAL /LPF
IMM GRANULOCYTES # BLD AUTO: 0.01 THOUSAND/UL (ref 0–0.2)
IMM GRANULOCYTES NFR BLD AUTO: 0 % (ref 0–2)
KETONES UR STRIP-MCNC: NEGATIVE MG/DL
LEUKOCYTE ESTERASE UR QL STRIP: NEGATIVE
LYMPHOCYTES # BLD AUTO: 2.12 THOUSANDS/ΜL (ref 0.6–4.47)
LYMPHOCYTES NFR BLD AUTO: 30 % (ref 14–44)
MCH RBC QN AUTO: 31.7 PG (ref 26.8–34.3)
MCHC RBC AUTO-ENTMCNC: 31.1 G/DL (ref 31.4–37.4)
MCV RBC AUTO: 102 FL (ref 82–98)
MONOCYTES # BLD AUTO: 0.38 THOUSAND/ΜL (ref 0.17–1.22)
MONOCYTES NFR BLD AUTO: 6 % (ref 4–12)
MUCOUS THREADS UR QL AUTO: ABNORMAL
NEUTROPHILS # BLD AUTO: 4.38 THOUSANDS/ΜL (ref 1.85–7.62)
NEUTS SEG NFR BLD AUTO: 63 % (ref 43–75)
NITRITE UR QL STRIP: NEGATIVE
NON-SQ EPI CELLS URNS QL MICRO: ABNORMAL /HPF
NRBC BLD AUTO-RTO: 0 /100 WBCS
PH UR STRIP.AUTO: 7 [PH]
PLATELET # BLD AUTO: 228 THOUSANDS/UL (ref 149–390)
PMV BLD AUTO: 10.7 FL (ref 8.9–12.7)
POTASSIUM SERPL-SCNC: 3.6 MMOL/L (ref 3.6–5)
PROT SERPL-MCNC: 7.3 G/DL (ref 5.9–8.4)
PROT UR STRIP-MCNC: ABNORMAL MG/DL
RBC # BLD AUTO: 4.41 MILLION/UL (ref 3.81–5.12)
RBC #/AREA URNS AUTO: ABNORMAL /HPF
SODIUM SERPL-SCNC: 141 MMOL/L (ref 137–147)
SP GR UR STRIP.AUTO: 1.01 (ref 1–1.04)
UROBILINOGEN UA: NEGATIVE MG/DL
WBC # BLD AUTO: 6.97 THOUSAND/UL (ref 4.31–10.16)
WBC #/AREA URNS AUTO: ABNORMAL /HPF

## 2022-05-11 PROCEDURE — 85025 COMPLETE CBC W/AUTO DIFF WBC: CPT

## 2022-05-11 PROCEDURE — 80053 COMPREHEN METABOLIC PANEL: CPT

## 2022-05-11 PROCEDURE — 36415 COLL VENOUS BLD VENIPUNCTURE: CPT

## 2022-05-11 PROCEDURE — 81001 URINALYSIS AUTO W/SCOPE: CPT | Performed by: FAMILY MEDICINE

## 2022-05-11 PROCEDURE — 83036 HEMOGLOBIN GLYCOSYLATED A1C: CPT

## 2022-05-11 PROCEDURE — 81003 URINALYSIS AUTO W/O SCOPE: CPT | Performed by: FAMILY MEDICINE

## 2022-05-12 LAB
EST. AVERAGE GLUCOSE BLD GHB EST-MCNC: 108 MG/DL
HBA1C MFR BLD: 5.4 %

## 2022-05-16 ENCOUNTER — OFFICE VISIT (OUTPATIENT)
Dept: FAMILY MEDICINE CLINIC | Facility: CLINIC | Age: 57
End: 2022-05-16

## 2022-05-16 VITALS
SYSTOLIC BLOOD PRESSURE: 128 MMHG | WEIGHT: 155 LBS | TEMPERATURE: 97.3 F | OXYGEN SATURATION: 98 % | HEIGHT: 64 IN | HEART RATE: 91 BPM | DIASTOLIC BLOOD PRESSURE: 76 MMHG | BODY MASS INDEX: 26.46 KG/M2 | RESPIRATION RATE: 16 BRPM

## 2022-05-16 DIAGNOSIS — M25.561 CHRONIC PAIN OF BOTH KNEES: Primary | ICD-10-CM

## 2022-05-16 DIAGNOSIS — F41.9 ANXIETY: ICD-10-CM

## 2022-05-16 DIAGNOSIS — R31.29 OTHER MICROSCOPIC HEMATURIA: ICD-10-CM

## 2022-05-16 DIAGNOSIS — G89.29 CHRONIC PAIN OF BOTH KNEES: Primary | ICD-10-CM

## 2022-05-16 DIAGNOSIS — R45.86 MOOD CHANGES: ICD-10-CM

## 2022-05-16 DIAGNOSIS — M25.562 CHRONIC PAIN OF BOTH KNEES: Primary | ICD-10-CM

## 2022-05-16 PROBLEM — R35.0 URINE FREQUENCY: Status: RESOLVED | Noted: 2021-10-07 | Resolved: 2022-05-16

## 2022-05-16 PROCEDURE — 3008F BODY MASS INDEX DOCD: CPT | Performed by: FAMILY MEDICINE

## 2022-05-16 PROCEDURE — 99213 OFFICE O/P EST LOW 20 MIN: CPT | Performed by: FAMILY MEDICINE

## 2022-05-16 RX ORDER — MELOXICAM 15 MG/1
15 TABLET ORAL DAILY
Qty: 30 TABLET | Refills: 5 | Status: SHIPPED | OUTPATIENT
Start: 2022-05-16

## 2022-05-16 NOTE — ASSESSMENT & PLAN NOTE
History of anxiety and previously on Xanax and Klonopin; Would like to speak to therapist as she refused in the past   No SI or HI today    - Referral to social work to assist with therapist

## 2022-05-16 NOTE — PROGRESS NOTES
Assessment/Plan:    Pain in both knees  L>R;  Likely muscular in nature as pain is proximal to the knee  Knee exercises printed for patient as she is not able to go to physical therapy  Exam unremarkable except for mild tenderness proximal to the knee  Patient takes advil which is helpful however would like something to take once a day  - Start meloxicam  15 mg daily PRN  Anxiety  History of anxiety and previously on Xanax and Klonopin; Would like to speak to therapist as she refused in the past   No SI or HI today  - Referral to social work to assist with therapist     Other microscopic hematuria  Noted on UA twice in the last 2 years; Patient with history of smoking  Currently in menopause  - Referral to urology  Diagnoses and all orders for this visit:    Chronic pain of both knees  -     meloxicam (Mobic) 15 mg tablet; Take 1 tablet (15 mg total) by mouth in the morning  Mood changes    Other microscopic hematuria  -     Ambulatory Referral to Urology; Future    Anxiety  -     Ambulatory Referral to Social Work Care Management Program; Future          Subjective:      Patient ID: Jenn Shannon is a 64 y o  female  Patient is a very pleasant 51-year-old female who presents to the clinic for follow-up on lab results  Her concerns today are regarding her bilateral knee pain  Patient states that it is worse than left knee  She states that the pain is not assigned any however proximal to the knee in the muscles  She denies any injury to the area and states that she does not have time for physical therapy  She also states that anxiety is improved however continues to have it however now she is open to speaking to a therapist       The following portions of the patient's history were reviewed and updated as appropriate:   She  has a past medical history of Anxiety and Psychiatric disorder    She   Patient Active Problem List    Diagnosis Date Noted    Other microscopic hematuria 2022    Acute left-sided low back pain without sciatica 2022    Pain in both knees 2022    Smoking 10/07/2021    Anxiety     Bilateral impacted cerumen      She  has a past surgical history that includes  section; Tubal ligation; Breast surgery; and Reduction mammaplasty (Bilateral)  Her family history includes Diabetes in her paternal grandfather and paternal grandmother; No Known Problems in her daughter, father, half-sister, maternal grandfather, maternal grandmother, mother, paternal aunt, paternal aunt, paternal aunt, paternal uncle, paternal uncle, paternal uncle, paternal uncle, and sister; Stroke in her paternal grandmother  She  reports that she has been smoking cigarettes  She has been smoking about 1 00 pack per day  She has never used smokeless tobacco  She reports previous alcohol use  She reports current drug use  Drug: Marijuana  Current Outpatient Medications   Medication Sig Dispense Refill    meloxicam (Mobic) 15 mg tablet Take 1 tablet (15 mg total) by mouth in the morning   30 tablet 5    acetaminophen (TYLENOL) 650 mg CR tablet Take 1 tablet (650 mg total) by mouth every 12 (twelve) hours 30 tablet 0    ALPRAZolam (Xanax) 0 5 mg tablet  (Patient not taking: Reported on 2021 )      carbamide peroxide (DEBROX) 6 5 % otic solution Administer 5 drops into both ears 2 (two) times a day (Patient not taking: Reported on 10/26/2021) 15 mL 2    clonazePAM (KlonoPIN) 1 mg tablet take 1 tablet as needed (Patient not taking: Reported on 10/26/2021)      fluticasone (FLONASE) 50 mcg/act nasal spray 1 spray into each nostril daily (Patient not taking: Reported on 10/26/2021) 16 g 0    hydrOXYzine HCL (ATARAX) 25 mg tablet Take 1 tablet (25 mg total) by mouth every 6 (six) hours as needed for itching 30 tablet 1    ibuprofen (MOTRIN) 400 mg tablet Take 1 tablet (400 mg total) by mouth every 6 (six) hours as needed for mild pain or moderate pain 30 tablet 1     No current facility-administered medications for this visit  Current Outpatient Medications on File Prior to Visit   Medication Sig    acetaminophen (TYLENOL) 650 mg CR tablet Take 1 tablet (650 mg total) by mouth every 12 (twelve) hours    ALPRAZolam (Xanax) 0 5 mg tablet  (Patient not taking: Reported on 12/7/2021 )    carbamide peroxide (DEBROX) 6 5 % otic solution Administer 5 drops into both ears 2 (two) times a day (Patient not taking: Reported on 10/26/2021)    clonazePAM (KlonoPIN) 1 mg tablet take 1 tablet as needed (Patient not taking: Reported on 10/26/2021)    fluticasone (FLONASE) 50 mcg/act nasal spray 1 spray into each nostril daily (Patient not taking: Reported on 10/26/2021)    hydrOXYzine HCL (ATARAX) 25 mg tablet Take 1 tablet (25 mg total) by mouth every 6 (six) hours as needed for itching    ibuprofen (MOTRIN) 400 mg tablet Take 1 tablet (400 mg total) by mouth every 6 (six) hours as needed for mild pain or moderate pain     No current facility-administered medications on file prior to visit  She has No Known Allergies       Review of Systems   Constitutional: Negative for activity change, appetite change and fever  HENT: Negative for congestion and sore throat  Eyes: Negative for visual disturbance  Respiratory: Negative for cough, shortness of breath and wheezing  Cardiovascular: Negative for chest pain and palpitations  Gastrointestinal: Negative for abdominal distention, abdominal pain, constipation, diarrhea and vomiting  Musculoskeletal: Positive for arthralgias  Skin: Negative for rash  Neurological: Positive for headaches (periodic)           Objective:      /76 (BP Location: Left arm, Patient Position: Sitting, Cuff Size: Standard)   Pulse 91   Temp (!) 97 3 °F (36 3 °C) (Temporal)   Resp 16   Ht 5' 4" (1 626 m)   Wt 70 3 kg (155 lb)   SpO2 98%   BMI 26 61 kg/m²          Physical Exam  Constitutional:       General: She is not in acute distress  Appearance: Normal appearance  She is well-developed and normal weight  She is not ill-appearing, toxic-appearing or diaphoretic  HENT:      Head: Normocephalic and atraumatic  Right Ear: External ear normal  There is impacted cerumen  Left Ear: External ear normal  There is impacted cerumen  Nose: Nose normal  No congestion or rhinorrhea  Mouth/Throat:      Mouth: Mucous membranes are moist       Pharynx: Oropharynx is clear  Eyes:      General: No scleral icterus  Right eye: No discharge  Left eye: No discharge  Extraocular Movements: Extraocular movements intact  Conjunctiva/sclera: Conjunctivae normal    Neck:      Thyroid: No thyromegaly  Cardiovascular:      Rate and Rhythm: Normal rate and regular rhythm  Pulses: Normal pulses  Heart sounds: Normal heart sounds  No murmur heard  No gallop  Pulmonary:      Effort: Pulmonary effort is normal  No respiratory distress  Breath sounds: Normal breath sounds  No wheezing  Abdominal:      General: Bowel sounds are normal  There is no distension  Palpations: Abdomen is soft  Tenderness: There is no abdominal tenderness  Musculoskeletal:         General: Normal range of motion  Cervical back: Normal range of motion and neck supple  No rigidity  No muscular tenderness  Right lower leg: No edema  Left lower leg: No edema  Lymphadenopathy:      Cervical: No cervical adenopathy  Skin:     General: Skin is warm  Findings: No erythema or rash  Neurological:      General: No focal deficit present  Mental Status: She is alert     Psychiatric:         Mood and Affect: Mood normal

## 2022-05-16 NOTE — ASSESSMENT & PLAN NOTE
Noted on UA twice in the last 2 years; Patient with history of smoking  Currently in menopause  - Referral to urology

## 2022-05-16 NOTE — ASSESSMENT & PLAN NOTE
L>R;  Likely muscular in nature as pain is proximal to the knee  Knee exercises printed for patient as she is not able to go to physical therapy  Exam unremarkable except for mild tenderness proximal to the knee  Patient takes advil which is helpful however would like something to take once a day  - Start meloxicam  15 mg daily PRN

## 2022-05-16 NOTE — PATIENT INSTRUCTIONS
Knee Exercises   AMBULATORY CARE:   What you need to know about knee exercises:  Knee exercises help strengthen the muscles around your knee  Strong muscles can help reduce pain and decrease your risk of future injury  Knee exercises also help you heal after an injury or surgery  Start slow  These are beginning exercises  Ask your healthcare provider if you need to see a physical therapist for more advanced exercises  As you get stronger, you may be able to do more sets of each exercise or add weights  Stop if you feel pain  It is normal to feel some discomfort at first  Regular exercise will help decrease your discomfort over time  Do the exercises on both legs  Do this so both knees remain strong  Warm up before you do knee exercises  Walk or ride a stationary bike for 5 or 10 minutes to warm your muscles  How to perform knee stretches safely:  Always stretch before you do strengthening exercises  Do these stretching exercises again after you do the strengthening exercises  Do these stretches 4 or 5 days a week, or as directed  Standing calf stretch: Face a wall and place both palms flat on the wall, or hold the back of a chair for balance  Keep a slight bend in your knees  Take a big step backward with one leg  Keep your other leg directly under you  Keep both heels flat and press your hips forward  Hold the stretch for 30 seconds, and then relax for 30 seconds  Switch legs  Repeat 2 or 3 times on each leg  Standing quadriceps stretch:  Stand and place one hand against a wall or hold the back of a chair for balance  With your weight on one leg, bend your other leg and grab your ankle  Bring your heel toward your buttocks  Hold the stretch for 30 to 60 seconds  Switch legs  Repeat 2 or 3 times on each leg  Sitting hamstring stretch:  Sit with both legs straight in front of you  Do not point or flex your toes   Place your palms on the floor and slide your hands forward until you feel the stretch  Do not round your back  Hold the stretch for 30 seconds  Repeat 2 or 3 times  How to perform knee strengthening exercises safely:  Do these exercises 4 or 5 days a week, or as directed  Standing half squats:  Stand with your feet shoulder-width apart  Lean your back against a wall or hold the back of a chair for balance, if needed  Slowly sit down about 10 inches, as if you are going to sit in a chair  Your body weight should be mostly over your heels  Hold the squat for 5 seconds, then rise to a standing position  Do 3 sets of 10 squats to strengthen your buttocks and thighs  Standing hamstring curls: Face a wall and place both palms flat on the wall, or hold the back of a chair for balance  With your weight on one leg, lift your other foot as close to your buttocks as you can  Hold for 5 seconds and then lower your leg  Do 2 sets of 10 curls on each leg  This exercise strengthens the muscles in the back of your thigh  Standing calf raises:  Face a wall and place both palms flat on the wall, or hold the back of a chair for balance  Stand up straight, and do not lean  Place all your weight on one leg by lifting the other foot off the floor  Raise the heel of the foot that is on the floor as high as you can and then lower it  Do 2 sets of 10 calf raises on each leg to strengthen your calf muscles  Straight leg lifts:  Lie on your stomach with straight legs  Fold your arms in front of you and rest your head in your arms  Tighten your leg muscles and raise one leg as high as you can  Hold for 5 seconds, then lower your leg  Do 2 sets of 10 lifts on each leg to strengthen your buttocks  Sitting leg lifts:  Sit in a chair  Slowly straighten and raise one leg  Squeeze your thigh muscles and hold for 5 seconds  Relax and return your foot to the floor  Do 2 sets of 10 lifts on each leg  This helps strengthen the muscles in the front of your thigh         Contact your healthcare provider if:   You have new pain or your pain becomes worse  You have questions or concerns about your condition or care  © Copyright [employer] 2022 Information is for End User's use only and may not be sold, redistributed or otherwise used for commercial purposes  All illustrations and images included in CareNotes® are the copyrighted property of [employer]  or [employer]onna [employer]  The above information is an  only  It is not intended as medical advice for individual conditions or treatments  Talk to your doctor, nurse or pharmacist before following any medical regimen to see if it is safe and effective for you

## 2022-05-17 ENCOUNTER — PATIENT OUTREACH (OUTPATIENT)
Dept: FAMILY MEDICINE CLINIC | Facility: CLINIC | Age: 57
End: 2022-05-17

## 2022-06-28 ENCOUNTER — TELEPHONE (OUTPATIENT)
Dept: FAMILY MEDICINE CLINIC | Facility: CLINIC | Age: 57
End: 2022-06-28

## 2022-06-28 NOTE — TELEPHONE ENCOUNTER
Called patient to discuss treatment plan as she missed her office visit on 6/27/2022  Would like to discuss that patient never completed Pap smear and wanted to recommend scheduling an appointment with us or obgyn to complete that  We will also like to order transvaginal ultrasound to check for any uterine pathology in the setting of microscopic hematuria in a post menopausal patient  Referral was also placed for urology at last office visit

## 2022-08-24 ENCOUNTER — OFFICE VISIT (OUTPATIENT)
Dept: UROLOGY | Facility: CLINIC | Age: 57
End: 2022-08-24
Payer: COMMERCIAL

## 2022-08-24 VITALS
HEIGHT: 64 IN | DIASTOLIC BLOOD PRESSURE: 76 MMHG | SYSTOLIC BLOOD PRESSURE: 116 MMHG | BODY MASS INDEX: 27.55 KG/M2 | OXYGEN SATURATION: 97 % | WEIGHT: 161.4 LBS | HEART RATE: 83 BPM

## 2022-08-24 DIAGNOSIS — R31.29 OTHER MICROSCOPIC HEMATURIA: Primary | ICD-10-CM

## 2022-08-24 DIAGNOSIS — R39.15 URINARY URGENCY: ICD-10-CM

## 2022-08-24 LAB
BACTERIA UR QL AUTO: ABNORMAL /HPF
BILIRUB UR QL STRIP: NEGATIVE
CLARITY UR: CLEAR
COLOR UR: ABNORMAL
GLUCOSE UR STRIP-MCNC: NEGATIVE MG/DL
HGB UR QL STRIP.AUTO: ABNORMAL
KETONES UR STRIP-MCNC: NEGATIVE MG/DL
LEUKOCYTE ESTERASE UR QL STRIP: ABNORMAL
MUCOUS THREADS UR QL AUTO: ABNORMAL
NITRITE UR QL STRIP: NEGATIVE
NON-SQ EPI CELLS URNS QL MICRO: ABNORMAL /HPF
PH UR STRIP.AUTO: 6.5 [PH]
PROT UR STRIP-MCNC: ABNORMAL MG/DL
RBC #/AREA URNS AUTO: ABNORMAL /HPF
SL AMB  POCT GLUCOSE, UA: ABNORMAL
SL AMB LEUKOCYTE ESTERASE,UA: ABNORMAL
SL AMB POCT BILIRUBIN,UA: ABNORMAL
SL AMB POCT BLOOD,UA: ABNORMAL
SL AMB POCT CLARITY,UA: ABNORMAL
SL AMB POCT COLOR,UA: YELLOW
SL AMB POCT KETONES,UA: ABNORMAL
SL AMB POCT NITRITE,UA: ABNORMAL
SL AMB POCT PH,UA: 6.5
SL AMB POCT SPECIFIC GRAVITY,UA: 1.02
SL AMB POCT URINE PROTEIN: ABNORMAL
SL AMB POCT UROBILINOGEN: 0.2
SP GR UR STRIP.AUTO: 1.02 (ref 1–1.03)
UROBILINOGEN UR STRIP-ACNC: <2 MG/DL
WBC #/AREA URNS AUTO: ABNORMAL /HPF

## 2022-08-24 PROCEDURE — 87086 URINE CULTURE/COLONY COUNT: CPT | Performed by: NURSE PRACTITIONER

## 2022-08-24 PROCEDURE — 81001 URINALYSIS AUTO W/SCOPE: CPT | Performed by: NURSE PRACTITIONER

## 2022-08-24 PROCEDURE — 99202 OFFICE O/P NEW SF 15 MIN: CPT | Performed by: NURSE PRACTITIONER

## 2022-08-24 PROCEDURE — 81002 URINALYSIS NONAUTO W/O SCOPE: CPT | Performed by: NURSE PRACTITIONER

## 2022-08-24 NOTE — PROGRESS NOTES
Assessment and plan: Other microscopic hematuria  · 41 pack-year smoking history  · Send urine micro and culture  · Schedule CT renal protocol  · Schedule cystoscopy    Urinary urgency  · Increased water intake  · Decrease bladder irritants  · Send urine microscopic and culture  · Not interested in pelvic floor physical therapy at this time    Milo Primrose, CRNP    History of Present Illness     Ana Edwards is a 64 y o  new patient who presents for microscopic hematuria  She denies gross hematuria  She now feels that her urine has an odor and she has urgency for the past 2 weeks  Gross hematuria: denies    Smoking history: 41 pack year hx    Chemical exposure: denies    Agent orange exposure:denies      Urine dip:+leuks, large blood    History of kidney stones: denies    Prior urological procedures: denies    History of pelvic radiation: denies    Family history of urothelial or renal cell carcinoma:  Denies    History of recurrent UTI:  Denies    Reports urge incontinence  She was told about blood in the urine previously but was not concerned about this  She has since developed urinary urgency and urge incontinence which prompted her visit today  She postponed a prior visit  She drinks:soda (2 cans), she has stopped drinking water, but can get about 1 bottle in a day  She drinks orange juice  She had urine microscopic completed in 2020 with 4-10 RBCs/hpf, no WBCs or bacteria seen  Her most recent urine test from May 2022 with 20-30 RBCs/hpf, moderate bacteria 0-1 hyaline casts, occasional mucus threads  No urine cultures on file        Laboratory     Lab Results   Component Value Date    BUN 14 05/11/2022    CREATININE 0 56 (L) 05/11/2022       No components found for: GFR    Lab Results   Component Value Date    GLUCOSE 78 03/27/2018    CALCIUM 9 7 05/11/2022     03/27/2018    K 3 6 05/11/2022    CO2 29 05/11/2022     05/11/2022       Lab Results   Component Value Date    WBC 6 97 05/11/2022    HGB 14 0 05/11/2022    HCT 45 0 05/11/2022     (H) 05/11/2022     05/11/2022       No results found for: PSA    Recent Results (from the past 1 hour(s))   POCT urine dip    Collection Time: 08/24/22  1:03 PM   Result Value Ref Range    LEUKOCYTE ESTERASE,UA large     NITRITE,UA neg     SL AMB POCT UROBILINOGEN 0 2     POCT URINE PROTEIN neg      PH,UA 6 5     BLOOD,UA large     SPECIFIC GRAVITY,UA 1 020     KETONES,UA neg     BILIRUBIN,UA small     GLUCOSE, UA neg      COLOR,UA yellow     CLARITY,UA cloudy        @RESULT(URINEMICROSCOPIC)@    @RESULT(URINECULTURE)@    Radiology       Review of Systems     Review of Systems   Constitutional: Negative for activity change, appetite change, chills, fatigue, fever and unexpected weight change  HENT: Negative for facial swelling  Eyes: Negative for discharge  Respiratory: Negative  Negative for cough and shortness of breath  Cardiovascular: Negative for chest pain and leg swelling  Gastrointestinal: Negative  Negative for abdominal distention, abdominal pain, constipation, diarrhea, nausea and vomiting  Endocrine: Negative  Genitourinary: Positive for frequency and urgency  Negative for decreased urine volume, difficulty urinating, dysuria, enuresis, flank pain, genital sores and hematuria  Urge incontinence   Musculoskeletal: Positive for back pain  Negative for myalgias  Skin: Negative for pallor and rash  Allergic/Immunologic: Negative  Negative for immunocompromised state  Neurological: Negative for facial asymmetry and speech difficulty  Psychiatric/Behavioral: Negative for agitation and confusion  Allergies     No Known Allergies    Physical Exam     Physical Exam  Vitals reviewed  Constitutional:       General: She is not in acute distress  Appearance: Normal appearance  She is normal weight  She is not ill-appearing, toxic-appearing or diaphoretic     HENT:      Head: Normocephalic and atraumatic  Eyes:      General: No scleral icterus  Cardiovascular:      Rate and Rhythm: Normal rate  Pulmonary:      Effort: Pulmonary effort is normal  No respiratory distress  Abdominal:      General: Abdomen is flat  There is no distension  Palpations: Abdomen is soft  Tenderness: There is no abdominal tenderness  There is no right CVA tenderness, left CVA tenderness, guarding or rebound  Musculoskeletal:         General: No swelling  Cervical back: Normal range of motion  Right lower leg: No edema  Left lower leg: No edema  Skin:     General: Skin is warm and dry  Coloration: Skin is not jaundiced or pale  Findings: No rash  Neurological:      General: No focal deficit present  Mental Status: She is alert and oriented to person, place, and time  Gait: Gait normal    Psychiatric:         Mood and Affect: Mood normal          Behavior: Behavior normal          Thought Content:  Thought content normal          Judgment: Judgment normal          Vital Signs     Vitals:    08/24/22 1255   BP: 116/76   Pulse: 83   SpO2: 97%   Weight: 73 2 kg (161 lb 6 4 oz)   Height: 5' 4" (1 626 m)       Current Medications       Current Outpatient Medications:     acetaminophen (TYLENOL) 650 mg CR tablet, Take 1 tablet (650 mg total) by mouth every 12 (twelve) hours, Disp: 30 tablet, Rfl: 0    ALPRAZolam (Xanax) 0 5 mg tablet, , Disp: , Rfl:     carbamide peroxide (DEBROX) 6 5 % otic solution, Administer 5 drops into both ears 2 (two) times a day (Patient not taking: Reported on 10/26/2021), Disp: 15 mL, Rfl: 2    clonazePAM (KlonoPIN) 1 mg tablet, take 1 tablet as needed (Patient not taking: Reported on 10/26/2021), Disp: , Rfl:     fluticasone (FLONASE) 50 mcg/act nasal spray, 1 spray into each nostril daily (Patient not taking: Reported on 10/26/2021), Disp: 16 g, Rfl: 0    hydrOXYzine HCL (ATARAX) 25 mg tablet, Take 1 tablet (25 mg total) by mouth every 6 (six) hours as needed for itching (Patient not taking: Reported on 2022), Disp: 30 tablet, Rfl: 1    ibuprofen (MOTRIN) 400 mg tablet, Take 1 tablet (400 mg total) by mouth every 6 (six) hours as needed for mild pain or moderate pain (Patient not taking: Reported on 2022), Disp: 30 tablet, Rfl: 1    meloxicam (Mobic) 15 mg tablet, Take 1 tablet (15 mg total) by mouth in the morning   (Patient not taking: Reported on 2022), Disp: 30 tablet, Rfl: 5    Active Problems     Patient Active Problem List   Diagnosis    Smoking    Anxiety    Bilateral impacted cerumen    Pain in both knees    Acute left-sided low back pain without sciatica    Other microscopic hematuria    Urinary urgency       Past Medical History     Past Medical History:   Diagnosis Date    Anxiety     Psychiatric disorder        Surgical History     Past Surgical History:   Procedure Laterality Date    BREAST SURGERY       SECTION      REDUCTION MAMMAPLASTY Bilateral         TUBAL LIGATION         Family History     Family History   Problem Relation Age of Onset    No Known Problems Father     No Known Problems Mother     No Known Problems Paternal Aunt     No Known Problems Paternal Aunt     No Known Problems Paternal Aunt     No Known Problems Paternal Uncle     No Known Problems Paternal Uncle     No Known Problems Paternal Uncle     No Known Problems Paternal Uncle     Diabetes Paternal Grandmother     Stroke Paternal Grandmother     Diabetes Paternal Grandfather     No Known Problems Maternal Grandmother     No Known Problems Maternal Grandfather     No Known Problems Sister     No Known Problems Daughter     No Known Problems Half-Sister        Social History     Social History     Social History     Tobacco Use   Smoking Status Current Every Day Smoker    Packs/day: 1 00    Types: Cigarettes   Smokeless Tobacco Never Used       Past Surgical History:   Procedure Laterality Date    BREAST SURGERY       SECTION      REDUCTION MAMMAPLASTY Bilateral     2001    TUBAL LIGATION           The following portions of the patient's history were reviewed and updated as appropriate: allergies, current medications, past family history, past medical history, past social history, past surgical history and problem list    Please note :  Voice dictation software has been used to create this document  There may be inadvertent transcription errors      [id number] [address]

## 2022-08-24 NOTE — ASSESSMENT & PLAN NOTE
· Increased water intake  · Decrease bladder irritants  · Send urine microscopic and culture  · Not interested in pelvic floor physical therapy at this time

## 2022-08-24 NOTE — PATIENT INSTRUCTIONS
BLADDER HEALTH    WHAT IS CONSIDERED NORMAL? The average bladder can hold about 2 cups of urine before it needs to be emptied  The normal range of voiding urine is 6 to 8 times during a 24 hour period  As we get older, our bladder capacity can get smaller and we may need to pass urine more frequently but usually not more than every 2 hours  Urine should flow easily without discomfort in a good, steady stream until the bladder is empty  No pushing or straining is necessary to empty the bladder  An urge is a signal that you feel as the bladder stretches to fill with urine  Urges can be felt even if the bladder is not full  Urges are not commands to go to the toilet, merely a signal and can be controlled  WHAT ARE GOOD BLADDER HABITS? Take your time when emptying your bladder  Dont strain or push to empty your bladder  Make sure you empty your bladder completely each time you pass urine  Do not rush the process  Consistently ignoring the urge to go (waiting more than 4 hours between toileting) or urinating too infrequently may be convenient but not healthy for your bladder  Avoid going to the toilet just in case or more often than every 2 hours  It is usually not necessary to go when you feel the first urge  Try to go only when your bladder is full  Urgency and frequency of urination can be improved by retraining the bladder and spacing your fluid intake throughout the day  Practice good toilet habits  Dont let your bladder control your life  TIPS TO MAINTAIN GOOD BLADDER HABITS  Maintain a good fluid intake  Depending on your body size and environment, drink 6 -8 cups (8 oz each) of fluid per day unless otherwise advised by your doctor  Not enough fluid creates a foul odor and dark color of the urine  Limit the amount of caffeine (coffee, cola, chocolate or tea) and citrus foods that you consume as these foods can be associated with increased sensation of urinary urgency and frequency  Limit the amount of alcohol you drink  Alcohol increases urine production and makes it difficult for the brain to coordinate bladder control  Avoid constipation by maintaining a balanced diet of dietary fiber  Cigarette smoking is also irritating to the bladder surface and is associated with bladder cancer  In addition, the coughing associated with smoking may lead to increased incontinent episodes because of the increased pressure  HOW DIET CAN AFFECT YOUR BLADDER  Although there is no particular "diet" that can cure bladder control, there are certain dietary suggestions you can use to help control the problem  There are 2 points to consider when evaluating how your habits and diet may affect your bladder:    Foods and fluids can irritate the bladder  Some foods and beverages are thought to contribute to bladder leakage and irritability  However their effect on the bladder is not completely understood and you may want to see if eliminating one or all of these items improves your bladder control  If you are unable to give them up completely, it is recommended that you use the following items in moderation:  Acidic beverages and foods (orange juice, grapefruit juice, lemonade etc)  Alcoholic beverages  Vinegar  Coffee (regular and decaf)  Tea (regular and decaf)  Caffeinated beverages  Carbonated beverages          Drinking enough and the right kinds of fluids  Many people with bladder control issues decrease their intake of liquids in hope that they will need to urinate less frequently or have less urinary leakage  You should not restrict fluids to control your bladder  While a decrease in liquid intake does result in a decrease in the volume of urine, the smaller amount of urine may be more highly concentrated  Highly concentrated, dark yellow urine is irritating to the bladder surface and may actually cause you to go to the bathroom more frequently        It also encourages the growth of bacteria, which may lead to infections resulting in incontinence  Substitutions for Bladder Irritants: water is always the best beverage choice  Grape and apple juice are thirst quenchers are good selections and are not as irritating to the bladder  Low acid fruits:  Pears, apricots, papaya, watermelon  For coffee drinkers: KAVA®, Postum®, Lilly®, Kaffree Jennifer®  For tea drinkers:  non-citrus or herbal and sun brewed tea    BEHAVIORAL THERAPY FOR IMPROVED BLADDER CONTROL      1  Urge Control Techniques       A  Stop whatever you are doing and concentrate on reyes your pelvic floor muscles  B   Contract your pelvic floor muscles repetitively (as in your "flick" exercises)  C   Once the urgency has subsided, realize that sometimes the urgency is because you have a             full bladder and have to urinate  Other times the urgency is a false signal and you do not have a full bladder  D  If you have urgency triggered by running water, "key in the door," getting up from a sitting position, etc , contract your pelvic floor muscles prior to       initiating the activity  2   Daily Fluid Intake       A  Avoid drinking too little (less than 3 cups/day) or drinking too much (more than 6             cups/day)  B   Avoid caffeinated beverages  C   If voiding frequently at night, limit your liquid intake after 7 p m  3   Bowel Regularity--Constipation Makes Bladder Symptoms Worse       A  Drink enough liquids, eat plenty of high fiber food  B  Exercise       C  Avoid laxatives and enemas on a regular basis, this decreases the bowel's normal function  4   Voiding Schedules       A  Empty your bladder at 1½ to 2 hour intervals even if you may not have the urge to urinate             at that time  You may gradually increase the time between voidings to 30  minutes, until you can comfortably void every 3 hours  B    If you are voiding more frequently than every 1½ to 2 hours, resist the urge to go  by using urge control techniques (described in 1 )

## 2022-08-24 NOTE — ASSESSMENT & PLAN NOTE
· 41 pack-year smoking history  · Send urine micro and culture  · Schedule CT renal protocol  · Schedule cystoscopy

## 2022-08-25 LAB — BACTERIA UR CULT: ABNORMAL

## 2022-08-26 ENCOUNTER — TELEPHONE (OUTPATIENT)
Dept: UROLOGY | Facility: CLINIC | Age: 57
End: 2022-08-26

## 2022-08-26 DIAGNOSIS — N30.00 ACUTE CYSTITIS WITHOUT HEMATURIA: Primary | ICD-10-CM

## 2022-08-26 RX ORDER — CEPHALEXIN 500 MG/1
500 CAPSULE ORAL EVERY 8 HOURS SCHEDULED
Qty: 15 CAPSULE | Refills: 0 | Status: SHIPPED | OUTPATIENT
Start: 2022-08-26 | End: 2022-08-31

## 2022-08-26 NOTE — TELEPHONE ENCOUNTER
Patient called to advise that she is still having symptoms of UTI and would like the Rx sent over to the [facility] on [address]

## 2022-08-26 NOTE — TELEPHONE ENCOUNTER
----- Message from [address] [place] [address] sent at 8/26/2022  1:26 PM EDT -----  Please let patient know urine testing came back with a small amount of bacteria  If she is having symptoms of UTI we can send an antibiotic to her pharmacy    If she is not have any UTI symptoms no need to treat

## 2022-08-26 NOTE — TELEPHONE ENCOUNTER
Called and left message for patient  Informed patient that culture came back with a small amount of bacteria  Asked patient to call office back to let us know if she is having urinary symptoms or not

## 2022-09-07 ENCOUNTER — HOSPITAL ENCOUNTER (OUTPATIENT)
Dept: CT IMAGING | Facility: HOSPITAL | Age: 57
Discharge: HOME | End: 2022-09-07
Payer: COMMERCIAL

## 2022-09-07 DIAGNOSIS — R31.29 OTHER MICROSCOPIC HEMATURIA: ICD-10-CM

## 2022-09-07 PROCEDURE — 74178 CT ABD&PLV WO CNTR FLWD CNTR: CPT

## 2022-09-07 PROCEDURE — G1004 CDSM NDSC: HCPCS

## 2022-09-07 RX ADMIN — IOHEXOL 100 ML: 300 INJECTION, SOLUTION INTRAVENOUS at 10:14

## 2022-09-29 ENCOUNTER — PROCEDURE VISIT (OUTPATIENT)
Dept: UROLOGY | Facility: CLINIC | Age: 57
End: 2022-09-29
Payer: COMMERCIAL

## 2022-09-29 ENCOUNTER — OFFICE VISIT (OUTPATIENT)
Dept: FAMILY MEDICINE CLINIC | Facility: CLINIC | Age: 57
End: 2022-09-29

## 2022-09-29 VITALS
DIASTOLIC BLOOD PRESSURE: 64 MMHG | HEIGHT: 64 IN | HEART RATE: 66 BPM | BODY MASS INDEX: 27.31 KG/M2 | SYSTOLIC BLOOD PRESSURE: 120 MMHG | WEIGHT: 160 LBS

## 2022-09-29 VITALS
DIASTOLIC BLOOD PRESSURE: 70 MMHG | RESPIRATION RATE: 19 BRPM | HEART RATE: 77 BPM | SYSTOLIC BLOOD PRESSURE: 118 MMHG | WEIGHT: 161 LBS | BODY MASS INDEX: 27.64 KG/M2 | OXYGEN SATURATION: 98 % | TEMPERATURE: 97.8 F

## 2022-09-29 DIAGNOSIS — R31.29 OTHER MICROSCOPIC HEMATURIA: Primary | ICD-10-CM

## 2022-09-29 DIAGNOSIS — R63.4 WEIGHT LOSS DUE TO MEDICATION: ICD-10-CM

## 2022-09-29 DIAGNOSIS — R39.15 URINARY URGENCY: ICD-10-CM

## 2022-09-29 DIAGNOSIS — D35.02 ADENOMA OF LEFT ADRENAL GLAND: ICD-10-CM

## 2022-09-29 DIAGNOSIS — F17.200 SMOKING: ICD-10-CM

## 2022-09-29 DIAGNOSIS — Z72.0 TOBACCO ABUSE: Primary | ICD-10-CM

## 2022-09-29 DIAGNOSIS — R06.83 SNORING: ICD-10-CM

## 2022-09-29 DIAGNOSIS — D35.02 ADRENAL ADENOMA, LEFT: ICD-10-CM

## 2022-09-29 DIAGNOSIS — T50.905A WEIGHT LOSS DUE TO MEDICATION: ICD-10-CM

## 2022-09-29 LAB
SL AMB  POCT GLUCOSE, UA: ABNORMAL
SL AMB LEUKOCYTE ESTERASE,UA: ABNORMAL
SL AMB POCT BILIRUBIN,UA: ABNORMAL
SL AMB POCT BLOOD,UA: ABNORMAL
SL AMB POCT CLARITY,UA: CLEAR
SL AMB POCT COLOR,UA: YELLOW
SL AMB POCT KETONES,UA: ABNORMAL
SL AMB POCT NITRITE,UA: ABNORMAL
SL AMB POCT PH,UA: 6.5
SL AMB POCT SPECIFIC GRAVITY,UA: 1.01
SL AMB POCT URINE PROTEIN: ABNORMAL
SL AMB POCT UROBILINOGEN: 0.2

## 2022-09-29 PROCEDURE — 99214 OFFICE O/P EST MOD 30 MIN: CPT | Performed by: UROLOGY

## 2022-09-29 PROCEDURE — 81002 URINALYSIS NONAUTO W/O SCOPE: CPT | Performed by: UROLOGY

## 2022-09-29 PROCEDURE — 52000 CYSTOURETHROSCOPY: CPT | Performed by: UROLOGY

## 2022-09-29 PROCEDURE — 99213 OFFICE O/P EST LOW 20 MIN: CPT | Performed by: FAMILY MEDICINE

## 2022-09-29 RX ORDER — DEXAMETHASONE 1 MG
TABLET ORAL
Qty: 1 TABLET | Refills: 0 | Status: SHIPPED | OUTPATIENT
Start: 2022-09-29

## 2022-09-29 NOTE — PROGRESS NOTES
Name: Heather Delgado      : 1965      MRN: [id number]  Encounter Provider: Sundar Dash MD  Encounter Date: 2022   Encounter department: [address] [place] [address]   1  Tobacco abuse  -     nicotine polacrilex (NICORETTE) 4 mg gum; Chew 1 each (4 mg total) as needed for smoking cessation    2  Snoring  Assessment & Plan:   reminding patient constantly she is snoring  She does endorse daytime somnolence   Does smoke 1 ppd    - NRt today  - Weight loss management & Smoking cessation    - Sleep medicine referral     Orders:  -     Ambulatory Referral to Sleep Medicine; Future    3  Smoking  Assessment & Plan:  1ppd for >20 years    - NRT today with gum as that is all patient is interested in  Will readdress patient's motivation and barriers at next appointment  4  Weight loss due to medication    5  Adenoma of left adrenal gland  Assessment & Plan:  Incidental 1 9 cm left adrenal adenoma on 22    - - D/w radiology who don't recommend followup imaging on these size adrenal adenomas as long as the patient is asymptomatic  A 1 year follow up CT scan (non contrast) can be helpful to document stability  Will discuss with patient at f/up to ensure shared decision making  Subjective    This is a very pleasant 62 y o  female who presents to the clinic for management of their chronic medical conditions  Patient's medical conditions are stable unless noted otherwise above  Patient has not had any recent hospitalizations, or medical emergencies since last visit  Patient has no further complaints other than what is mentioned in the ROS  Review of Systems   Constitutional: Negative for chills, fever and unexpected weight change  HENT: Negative for ear pain and sore throat  Eyes: Negative for pain and visual disturbance  Respiratory: Negative for cough and shortness of breath  Cardiovascular: Negative for chest pain and palpitations  Gastrointestinal: Negative for abdominal pain and vomiting  Genitourinary: Negative for dysuria and hematuria  Musculoskeletal: Positive for arthralgias, back pain and joint swelling  Negative for gait problem  Skin: Negative for color change and rash  Neurological: Negative for seizures and syncope  All other systems reviewed and are negative  Current Outpatient Medications on File Prior to Visit   Medication Sig    acetaminophen (TYLENOL) 650 mg CR tablet Take 1 tablet (650 mg total) by mouth every 12 (twelve) hours    ALPRAZolam (XANAX) 0 5 mg tablet  (Patient not taking: No sig reported)    carbamide peroxide (DEBROX) 6 5 % otic solution Administer 5 drops into both ears 2 (two) times a day (Patient not taking: No sig reported)    clonazePAM (KlonoPIN) 1 mg tablet take 1 tablet as needed (Patient not taking: No sig reported)    dexamethasone (DECADRON) 1 mg tablet Take one pill at 11pm the night prior to morning blood testing    fluticasone (FLONASE) 50 mcg/act nasal spray 1 spray into each nostril daily (Patient not taking: No sig reported)    hydrOXYzine HCL (ATARAX) 25 mg tablet Take 1 tablet (25 mg total) by mouth every 6 (six) hours as needed for itching (Patient not taking: No sig reported)    ibuprofen (MOTRIN) 400 mg tablet Take 1 tablet (400 mg total) by mouth every 6 (six) hours as needed for mild pain or moderate pain (Patient not taking: No sig reported)    meloxicam (Mobic) 15 mg tablet Take 1 tablet (15 mg total) by mouth in the morning  (Patient not taking: No sig reported)       Objective     /70 (BP Location: Left arm, Patient Position: Sitting, Cuff Size: Adult)   Pulse 77   Temp 97 8 °F (36 6 °C) (Temporal)   Resp 19   Wt 73 kg (161 lb)   SpO2 98%   BMI 27 64 kg/m²     Physical Exam  Vitals and nursing note reviewed  Constitutional:       Appearance: Normal appearance  HENT:      Head: Normocephalic        Nose: Nose normal       Mouth/Throat: Mouth: Mucous membranes are moist    Neck:      Thyroid: No thyroid mass, thyromegaly or thyroid tenderness  Cardiovascular:      Rate and Rhythm: Normal rate and regular rhythm  Pulses: Normal pulses  Heart sounds: Normal heart sounds  Pulmonary:      Effort: Pulmonary effort is normal       Breath sounds: Normal breath sounds  Abdominal:      Palpations: Abdomen is soft  Musculoskeletal:      Cervical back: Full passive range of motion without pain  Skin:     General: Skin is warm  Neurological:      General: No focal deficit present  Mental Status: She is alert and oriented to person, place, and time         Candy Huynh MD

## 2022-09-29 NOTE — PROGRESS NOTES
Cystoscopy     Date/Time 9/29/2022 1:10 PM     Performed by  Lu Arce MD     Authorized by JERROD Borjas      [place] Protocol:  Timeout called at: 9/29/2022 1:10 PM       Procedure Details:  Procedure type: cystoscopy    Patient tolerance: Patient tolerated the procedure well with no immediate complications    Additional Procedure Details:   Cystoscopy Procedure note    Risk and benefits of flexible cystoscopy were discussed  Informed consent was obtained  A urine dip is adequate for cystoscopy  The patient was placed into the modified supine position  Her genitalia was prepped with Betadine and draped in a sterile fashion  Viscous 2% lidocaine was instilled into the urethra and allowed dwell time for local anesthesia  Flexible cystoscopy was then performed with a 16F scope  Urethra was inspected on entrance and exit of the scope  The bladder was thoroughly inspected in a 360 degree fashion  Both ureteral orifices were visualized in their orthotopic location with clear efflux of urine  The bladder mucosa was thoroughly inspected  There was no evidence of mucosal abnormalities, stones, or lesions  Retroflexion for evaluation of the bladder neck was normal       Overall this was a negative cystoscopy  A female office staff member was present throughout the entire procedure

## 2022-09-29 NOTE — ASSESSMENT & PLAN NOTE
1ppd for >20 years    - NRT today with gum as that is all patient is interested in  Will readdress patient's motivation and barriers at next appointment

## 2022-09-29 NOTE — ASSESSMENT & PLAN NOTE
reminding patient constantly she is snoring     She does endorse daytime somnolence   Does smoke 1 ppd    - NRt today  - Weight loss management & Smoking cessation    - Sleep medicine referral

## 2022-09-29 NOTE — PROGRESS NOTES
UROLOGY PROCEDURE NOTE     CHIEF COMPLAINT   Libia Rodriguez is a 62 y o  female with a complaint of   Chief Complaint   Patient presents with    Cystoscopy       History of Present Illness:     62 y o  female longstanding history of tobacco abuse, 41 pack-year history  Patient presented with microscopic hematuria in urine testing without evidence of gross hematuria  Recommended for evaluation  Patient does have a history of urinary urgency  Was offered pelvic floor physical therapy but deferred      Past Medical History:     Past Medical History:   Diagnosis Date    Anxiety     Psychiatric disorder        PAST SURGICAL HISTORY:     Past Surgical History:   Procedure Laterality Date    BREAST SURGERY       SECTION      CYSTOSCOPY  2022    [place]    REDUCTION MAMMAPLASTY Bilateral         TUBAL LIGATION         CURRENT MEDICATIONS:     Current Outpatient Medications   Medication Sig Dispense Refill    acetaminophen (TYLENOL) 650 mg CR tablet Take 1 tablet (650 mg total) by mouth every 12 (twelve) hours 30 tablet 0    ALPRAZolam (XANAX) 0 5 mg tablet  (Patient not taking: No sig reported)      carbamide peroxide (DEBROX) 6 5 % otic solution Administer 5 drops into both ears 2 (two) times a day (Patient not taking: No sig reported) 15 mL 2    clonazePAM (KlonoPIN) 1 mg tablet take 1 tablet as needed (Patient not taking: No sig reported)      fluticasone (FLONASE) 50 mcg/act nasal spray 1 spray into each nostril daily (Patient not taking: No sig reported) 16 g 0    hydrOXYzine HCL (ATARAX) 25 mg tablet Take 1 tablet (25 mg total) by mouth every 6 (six) hours as needed for itching (Patient not taking: No sig reported) 30 tablet 1    ibuprofen (MOTRIN) 400 mg tablet Take 1 tablet (400 mg total) by mouth every 6 (six) hours as needed for mild pain or moderate pain (Patient not taking: No sig reported) 30 tablet 1    meloxicam (Mobic) 15 mg tablet Take 1 tablet (15 mg total) by mouth in the morning  (Patient not taking: No sig reported) 30 tablet 5     No current facility-administered medications for this visit  ALLERGIES:   No Known Allergies    SOCIAL HISTORY:     Social History     Socioeconomic History    Marital status: /Civil Union     Spouse name: None    Number of children: None    Years of education: None    Highest education level: None   Occupational History    None   Tobacco Use    Smoking status: Current Every Day Smoker     Packs/day: 1 00     Types: Cigarettes    Smokeless tobacco: Never Used   Vaping Use    Vaping Use: Never used   Substance and Sexual Activity    Alcohol use: Yes     Comment: ocassionally    Drug use: Yes     Types: Marijuana     Comment: ocassionally    Sexual activity: Not Currently   Other Topics Concern    None   Social History Narrative    None     Social Determinants of Health     Financial Resource Strain: Low Risk     Difficulty of Paying Living Expenses: Not hard at all   Food Insecurity: No Food Insecurity    Worried About Running Out of Food in the Last Year: Never true    Jorge of Food in the Last Year: Never true   Transportation Needs: No Transportation Needs    Lack of Transportation (Medical): No    Lack of Transportation (Non-Medical): No   Physical Activity: Not on file   Stress: Stress Concern Present    Feeling of Stress :  To some extent   Social Connections: Not on file   Intimate Partner Violence: Not on file   Housing Stability: Not on file       SOCIAL HISTORY:     Family History   Problem Relation Age of Onset    No Known Problems Father     No Known Problems Mother     No Known Problems Paternal Aunt     No Known Problems Paternal Aunt     No Known Problems Paternal Aunt     No Known Problems Paternal Uncle     No Known Problems Paternal Uncle     No Known Problems Paternal Uncle     No Known Problems Paternal Uncle     Diabetes Paternal Grandmother     Stroke Paternal Grandmother     Diabetes Paternal Grandfather     No Known Problems Maternal Grandmother     No Known Problems Maternal Grandfather     No Known Problems Sister     No Known Problems Daughter     No Known Problems Half-Sister        REVIEW OF SYSTEMS:     Review of Systems   Constitutional: Negative  Respiratory: Negative  Cardiovascular: Negative  Gastrointestinal: Negative  Genitourinary: Negative  Musculoskeletal: Negative  Skin: Negative  Psychiatric/Behavioral: Negative  PHYSICAL EXAM:     /64 (BP Location: Left arm, Patient Position: Sitting, Cuff Size: Adult)   Pulse 66   Ht 5' 4" (1 626 m)   Wt 72 6 kg (160 lb)   BMI 27 46 kg/m²     General:  Healthy appearing female in no acute distress  They have a normal affect  There is not appear to be any gross neurologic defects or abnormalities  HEENT:  Normocephalic, atraumatic  Neck is supple without any palpable lymphadenopathy  Cardiovascular:  Patient has normal palpable distal radial pulses  There is no significant peripheral edema  No JVD is noted  Respiratory:  Patient has unlabored respirations  There is no audible wheeze or rhonchi  Abdomen:  Abdomen is without surgical scars  Abdomen is soft and nontender  There is no tympany  Inguinal and umbilical hernia are not appreciated  Genitourinary:  Normal external female genitalia    Musculoskeletal:  Patient does not have significant CVA tenderness in the  flank with palpation or percussion  They full range of motion in all 4 extremities  Strength in all 4 extremities appears congruent  Patient is able to ambulate without assistance or difficulty  Dermatologic:  Patient has no skin abnormalities or rashes        LABS:     CBC:   Lab Results   Component Value Date    WBC 6 97 05/11/2022    HGB 14 0 05/11/2022    HCT 45 0 05/11/2022     (H) 05/11/2022     05/11/2022       BMP:   Lab Results   Component Value Date    GLUCOSE 78 03/27/2018    CALCIUM 9 7 05/11/2022     03/27/2018    K 3 6 05/11/2022    CO2 29 05/11/2022     05/11/2022    BUN 14 05/11/2022    CREATININE 0 56 (L) 05/11/2022 8/24/22  1:33 PM 8/24/22  1:03 PM 5/11/22  9:14 AM 5/11/22  9:14 AM 10/7/21 11:16 AM 1/1/20 11:40 AM 1/1/20 11:40 AM    Color, UA  Light Yellow  yellow   Yellow R  yellow   Straw R    Clarity, UA  Clear  cloudy   Clear R  clear   Clear R    Specific [place], UA 1 003 - 1 030 1 017  1 020 R   1 015 R  1 000 R   1 005 R    pH, UA 4 5, 5 0, 5 5, 6 0, 6 5, 7 0, 7 5, 8 0 6 5  6 5 R   7 0  7 R   7 0    Leukocytes, UA Negative Large Abnormal   large R   Negative  negative R   Negative    Nitrite, UA Negative Negative  neg R   Negative  negative R   Negative    Protein, UA Negative mg/dl Trace Abnormal   neg R   15 (Trace) Abnormal   negative R   Negative    Glucose, UA Negative mg/dl Negative  neg R   Negative  250 R   Negative    Ketones, UA Negative mg/dl Negative  neg R   Negative  negative R   Negative    Urobilinogen, UA <2 0 mg/dl mg/dl <2 0          Bilirubin, UA Negative Negative    Negative    Negative    Occult Blood, UA Negative Large Abnormal   large R   250 0 Abnormal   negative R   250 0 Abnormal     RBC, UA None Seen, 1-2 /hpf Innumerable Abnormal    20-30 Abnormal  R    4-10 Abnormal  R     WBC, UA None Seen, 1-2 /hpf 30-50 Abnormal    0-1 R    None Seen R     Epithelial Cells None Seen, Occasional /hpf None Seen   Occasional    Occasional     Bacteria, UA None Seen, Occasional /hpf None Seen   Moderate Abnormal     None Seen     MUCUS THREADS None Seen Occasional Abnormal    Occasional Abnormal         SL AMB POCT UROBILINOGEN   0 2   Negative R  normal   Negative R    BILIRUBIN,UA   small    negative      Hyaline Casts, UA    0-1 Abnormal  R                   Specimen Collected: 08/24/22  1:33            Urine Culture <10,000 cfu/ml Gram Negative Gene Enteric Like Abnormal                   Specimen Collected: 08/24/22  1:33              IMAGING: 9/7/22  CT ABDOMEN AND PELVIS WITH AND WITHOUT IV CONTRAST     INDICATION:   R31 29: Other microscopic hematuria      COMPARISON:  None      TECHNIQUE: Initial CT of the abdomen and pelvis was performed without intravenous contrast   Subsequent dynamic CT evaluation of the abdomen and pelvis was performed after the administration of intravenous contrast in both nephrographic and delayed   phases after the administration of intravenous contrast    Axial, sagittal, and coronal 2D reformatted images were created from the source data and submitted for interpretation       Radiation dose length product (DLP) for this visit:  1408 mGy-cm   This examination, like all CT scans performed in the [facility], was performed utilizing techniques to minimize radiation dose exposure, including the use of iterative   reconstruction and automated exposure control      IV Contrast:  100 mL of iohexol (OMNIPAQUE)  Enteric Contrast:  Enteric contrast was not administered      FINDINGS:     ABDOMEN     RIGHT KIDNEY AND URETER:  No solid renal mass  No detectable urothelial mass  Multiple cysts of varying sizes  No hydronephrosis or hydroureter  No urinary tract calculi  No perinephric collection      LEFT KIDNEY AND URETER:  No solid renal mass  No detectable urothelial mass  Multiple cysts of varying sizes  The largest cyst is measuring 6 cm and is located at the lower pole  7 mm exophytic nonenhancing nodule (2/35, 3/39, 6/43)  This likely represents a hyperdense   cyst   No hydronephrosis or hydroureter  2 mm calculus versus calcification mid left kidney (2/40)  No perinephric collection      URINARY BLADDER:  The urinary bladder is not well-distended and there is mild nonspecific wall thickening  No calculi         LOWER CHEST:  No clinically significant abnormality identified in the visualized lower chest      LIVER/BILIARY TREE:  Unremarkable      GALLBLADDER:  No calcified gallstones   No pericholecystic inflammatory change      SPLEEN:  Unremarkable      PANCREAS:  Unremarkable      ADRENAL GLANDS:  1 9 cm left adrenal adenoma      STOMACH AND BOWEL:  Unremarkable      APPENDIX:  No findings to suggest appendicitis      ABDOMINOPELVIC CAVITY:  No ascites  No free intraperitoneal air  No lymphadenopathy      VESSELS:  Unremarkable for patient's age      PELVIS     REPRODUCTIVE ORGANS:  Unremarkable for patient's age      ABDOMINAL WALL/INGUINAL REGIONS:  Unremarkable      OSSEOUS STRUCTURES:  No acute fracture or destructive osseous lesion      IMPRESSION:        1  Multiple cysts in both kidneys  2   Subcentimeter hyperdense cyst of the left kidney  3   2 mm calcification or calculus of the left kidney  No hydronephrosis  4   The urinary bladder is not well-distended and there is mild nonspecific wall thickening  Clinical correlation for cystitis  5   Incidental 1 9 cm left adrenal adenoma  PROCEDURE:     SEE NOTE    ASSESSMENT:     62 y o  female with tobacco abuse history and microscopic hematuria    PLAN:     Reviewed the findings of the CT scan with the patient  Cystic disease of the kidneys, small nonobstructing stone and a left adrenal adenoma  No findings on cystoscopy today  Discussed implication of tobacco abuse on development of genitourinary cancers  Recommend adrenal laboratory testing    Follow-up in 6 months

## 2022-09-29 NOTE — PATIENT INSTRUCTIONS
You have an abnormal lesion on your adrenal gland  In order to test this for production of abnormal hormones, you will be undergoing morning blood work  The night prior to the blood work, at 11:00 p m , you must take the dexamethasone pill prescribed  You must proceed for early morning blood work the following morning

## 2022-09-30 PROBLEM — R63.4 WEIGHT LOSS DUE TO MEDICATION: Status: ACTIVE | Noted: 2022-09-30

## 2022-09-30 PROBLEM — T50.905A WEIGHT LOSS DUE TO MEDICATION: Status: ACTIVE | Noted: 2022-09-30

## 2022-09-30 NOTE — ASSESSMENT & PLAN NOTE
Endorsing left knee discomfort only  Has tried knee exercises though declined physical therapy in the past  No medications at present though taken meloxicam and Advil in the past with minimal benefit    - discussed options for treatment today including physical therapy, gentle exercise, with consideration of steroid injection  - patient reluctant for any intervention at this time would rather continue to monitor her symptoms    Will follow-up in clinic in 3 months

## 2022-10-01 ENCOUNTER — APPOINTMENT (OUTPATIENT)
Dept: LAB | Facility: HOSPITAL | Age: 57
End: 2022-10-01
Attending: UROLOGY
Payer: COMMERCIAL

## 2022-10-01 DIAGNOSIS — D35.02 ADRENAL ADENOMA, LEFT: ICD-10-CM

## 2022-10-01 LAB — CORTIS AM PEAK SERPL-MCNC: 16.6 UG/DL (ref 4.2–22.4)

## 2022-10-01 PROCEDURE — 36415 COLL VENOUS BLD VENIPUNCTURE: CPT

## 2022-10-01 PROCEDURE — 83835 ASSAY OF METANEPHRINES: CPT

## 2022-10-01 PROCEDURE — 82088 ASSAY OF ALDOSTERONE: CPT

## 2022-10-01 PROCEDURE — 84244 ASSAY OF RENIN: CPT

## 2022-10-01 PROCEDURE — 82533 TOTAL CORTISOL: CPT

## 2022-10-03 PROBLEM — D35.00 ADRENAL ADENOMA: Status: ACTIVE | Noted: 2022-10-03

## 2022-10-03 PROBLEM — D35.02 ADENOMA OF LEFT ADRENAL GLAND: Status: ACTIVE | Noted: 2022-10-03

## 2022-10-03 NOTE — ASSESSMENT & PLAN NOTE
Incidental 1 9 cm left adrenal adenoma on 9/7/22    - - D/w radiology who don't recommend followup imaging on these size adrenal adenomas as long as the patient is asymptomatic  A 1 year follow up CT scan (non contrast) can be helpful to document stability  Will discuss with patient at f/up to ensure shared decision making

## 2022-10-04 LAB — ALDOST SERPL-MCNC: 2.5 NG/DL (ref 0–30)

## 2022-10-05 LAB
METANEPH FREE SERPL-MCNC: 41.1 PG/ML (ref 0–88)
NORMETANEPHRINE SERPL-MCNC: 125.7 PG/ML (ref 0–244)

## 2022-10-07 LAB — RENIN PLAS-CCNC: 0.59 NG/ML/HR (ref 0.17–5.38)

## 2022-10-20 ENCOUNTER — OFFICE VISIT (OUTPATIENT)
Dept: SLEEP CENTER | Facility: CLINIC | Age: 57
End: 2022-10-20
Payer: COMMERCIAL

## 2022-10-20 VITALS
BODY MASS INDEX: 27.72 KG/M2 | WEIGHT: 162.4 LBS | HEART RATE: 86 BPM | SYSTOLIC BLOOD PRESSURE: 114 MMHG | OXYGEN SATURATION: 97 % | HEIGHT: 64 IN | DIASTOLIC BLOOD PRESSURE: 70 MMHG

## 2022-10-20 DIAGNOSIS — G47.33 OSA (OBSTRUCTIVE SLEEP APNEA): Primary | ICD-10-CM

## 2022-10-20 DIAGNOSIS — R06.83 SNORING: ICD-10-CM

## 2022-10-20 PROCEDURE — 99203 OFFICE O/P NEW LOW 30 MIN: CPT | Performed by: INTERNAL MEDICINE

## 2022-10-20 NOTE — PROGRESS NOTES
Consultation - [id number] SAMANIEGO: 1965  MRN: [id number]      Assessment:  The patient's symptoms are consistent with obstructive sleep apnea  She has loud snoring and witnessed apneas, although she denies much in the way of daytime sleepiness  She is rarely hypertensive, except for the 1st thing in the morning  She also has symptoms which are suggestive of restless legs syndrome  Plan:  Polysomnography to evaluate for obstructive sleep apnea and periodic limb movement disorder  The patient's home environment does not lend itself to a good home sleep apnea test   She has difficulty sleeping in her own bed because of her 's sleep problems  Testing in the sleep lab is necessary  Follow up: After testing    History of Present Illness:   62 y  o female with a history of loud snoring and witnessed apneas  Her sleep quality is otherwise poor due to active movement during sleep  The patient has chronic headaches  She has diffuse marked muscle weakness  She is chronically exhausted  She has recently gained 20 lb  She reports leg discomfort, but not exclusively in the evening or at bedtime          Review of Systems      Genitourinary need to urinate more than twice a night and hot flashes at night  Cardiology none  Gastrointestinal frequent heartburn/acid reflux  Neurology frequent headaches, awaken with headache, muscle weakness, numbness/tingling of an extremity, forgetfulness, poor concentration or confusion,  and difficulty with memory  Constitutional fatigue and weight change  Integumentary rash or dry skin and itching  Psychiatry anxiety, depression, aggressiveness or irritability and mood change  Musculoskeletal joint pain, muscle aches, back pain, legs twitching/jerking and leg cramps  Pulmonary shortness of breath with activity and snoring  ENT ringing in ears  Endocrine excessive thirst and frequent urination  Hematological none    I have reviewed and updated the review of systems as necessary    Historical Information        Family History: non-contributory    Social History     Socioeconomic History   • Marital status: /Civil Union     Spouse name: None   • Number of children: None   • Years of education: None   • Highest education level: None   Occupational History   • None   Tobacco Use   • Smoking status: Current Every Day Smoker     Packs/day: 1 00     Types: Cigarettes   • Smokeless tobacco: Never Used   Vaping Use   • Vaping Use: Never used   Substance and Sexual Activity   • Alcohol use: Yes     Comment: ocassionally   • Drug use: Yes     Types: Marijuana     Comment: ocassionally   • Sexual activity: Not Currently   Other Topics Concern   • None   Social History Narrative   • None     Social Determinants of Health     Financial Resource Strain: Low Risk    • Difficulty of Paying Living Expenses: Not hard at all   Food Insecurity: No Food Insecurity   • Worried About Running Out of Food in the Last Year: Never true   • Ran Out of Food in the Last Year: Never true   Transportation Needs: No Transportation Needs   • Lack of Transportation (Medical): No   • Lack of Transportation (Non-Medical): No   Physical Activity: Not on file   Stress: Stress Concern Present   • Feeling of Stress :  To some extent   Social Connections: Not on file   Intimate Partner Violence: Not on file   Housing Stability: Not on file         Sleep Schedule: unremarkable    Snoring:  Yes    Witnessed Apnea:  Yes    Medications/Allergies:    Current Outpatient Medications:   •  acetaminophen (TYLENOL) 650 mg CR tablet, Take 1 tablet (650 mg total) by mouth every 12 (twelve) hours, Disp: 30 tablet, Rfl: 0  •  dexamethasone (DECADRON) 1 mg tablet, Take one pill at 11pm the night prior to morning blood testing, Disp: 1 tablet, Rfl: 0  •  ibuprofen (MOTRIN) 400 mg tablet, Take 1 tablet (400 mg total) by mouth every 6 (six) hours as needed for mild pain or moderate pain, Disp: 30 tablet, Rfl: 1  • nicotine polacrilex (NICORETTE) 4 mg gum, Chew 1 each (4 mg total) as needed for smoking cessation, Disp: 100 each, Rfl: 0  •  ALPRAZolam (XANAX) 0 5 mg tablet, , Disp: , Rfl:   •  carbamide peroxide (DEBROX) 6 5 % otic solution, Administer 5 drops into both ears 2 (two) times a day (Patient not taking: No sig reported), Disp: 15 mL, Rfl: 2  •  clonazePAM (KlonoPIN) 1 mg tablet, take 1 tablet as needed (Patient not taking: No sig reported), Disp: , Rfl:   •  fluticasone (FLONASE) 50 mcg/act nasal spray, 1 spray into each nostril daily (Patient not taking: No sig reported), Disp: 16 g, Rfl: 0  •  hydrOXYzine HCL (ATARAX) 25 mg tablet, Take 1 tablet (25 mg total) by mouth every 6 (six) hours as needed for itching (Patient not taking: No sig reported), Disp: 30 tablet, Rfl: 1  •  meloxicam (Mobic) 15 mg tablet, Take 1 tablet (15 mg total) by mouth in the morning  (Patient not taking: No sig reported), Disp: 30 tablet, Rfl: 5        No notes on file                  Objective:    Vital Signs:   Vitals:    10/20/22 1300   BP: 114/70   BP Location: Left arm   Cuff Size: Adult   Pulse: 86   SpO2: 97%   Weight: 73 7 kg (162 lb 6 4 oz)   Height: 5' 4" (1 626 m)     Neck Circumference: 14      Barbeau Sleepiness Scale:      Physical Exam:    General: Alert, appropriate, cooperative, normal build    Head: NC/AT, mild retrognathia    Nose: No septal deviation, nares not obstructed, mucosa normal    Throat: Airway diminished, tongue base thickened, no tonsils visualized    Neck: Normal, no thyromegaly or lymphadenopathy, no JVD    Heart: RR, normal S1 and S2, no murmurs    Chest: Clear bilaterally    Extremity: No clubbing, cyanosis, no edema    Skin: Warm, dry    Neuro: No motor abnormalities, cranial nerves appear intact        Counseling / Coordination of Care  A description of the counseling / coordination of care: We discussed the pathophysiology of obstructive sleep apnea as well as the possible treatment options   We also discussed the rationale for positive airway pressure therapy  Board Certified Sleep Specialist    Portions of the record may have been created with voice recognition software  Occasional wrong word or "sound a like" substitutions may have occurred due to the inherent limitations of voice recognition software  Read the chart carefully and recognize, using context, where substitutions have occurred

## 2022-11-01 ENCOUNTER — HOSPITAL ENCOUNTER (OUTPATIENT)
Dept: MAMMOGRAPHY | Facility: CLINIC | Age: 57
Discharge: HOME | End: 2022-11-01

## 2022-11-01 VITALS — BODY MASS INDEX: 27.66 KG/M2 | HEIGHT: 64 IN | WEIGHT: 162 LBS

## 2022-11-01 DIAGNOSIS — Z09 FOLLOW-UP EXAM, 3-6 MONTHS SINCE PREVIOUS EXAM: ICD-10-CM

## 2022-11-21 ENCOUNTER — HOSPITAL ENCOUNTER (EMERGENCY)
Facility: HOSPITAL | Age: 57
Discharge: HOME | End: 2022-11-21
Attending: EMERGENCY MEDICINE

## 2022-11-21 ENCOUNTER — APPOINTMENT (EMERGENCY)
Dept: RADIOLOGY | Facility: HOSPITAL | Age: 57
End: 2022-11-21

## 2022-11-21 VITALS
RESPIRATION RATE: 16 BRPM | TEMPERATURE: 99.9 F | WEIGHT: 164.9 LBS | SYSTOLIC BLOOD PRESSURE: 138 MMHG | OXYGEN SATURATION: 97 % | BODY MASS INDEX: 28.31 KG/M2 | HEART RATE: 105 BPM | DIASTOLIC BLOOD PRESSURE: 99 MMHG

## 2022-11-21 DIAGNOSIS — B34.9 VIRAL SYNDROME: Primary | ICD-10-CM

## 2022-11-21 DIAGNOSIS — U07.1 COVID-19: ICD-10-CM

## 2022-11-21 LAB
ALBUMIN SERPL BCP-MCNC: 3.9 G/DL (ref 3.5–5)
ALP SERPL-CCNC: 108 U/L (ref 43–122)
ALT SERPL W P-5'-P-CCNC: 19 U/L
ANION GAP SERPL CALCULATED.3IONS-SCNC: 5 MMOL/L (ref 5–14)
AST SERPL W P-5'-P-CCNC: 27 U/L (ref 14–36)
ATRIAL RATE: 114 BPM
BACTERIA UR QL AUTO: ABNORMAL /HPF
BASOPHILS # BLD AUTO: 0.02 THOUSANDS/ÂΜL (ref 0–0.1)
BASOPHILS NFR BLD AUTO: 0 % (ref 0–1)
BILIRUB SERPL-MCNC: 0.42 MG/DL (ref 0.2–1)
BILIRUB UR QL STRIP: NEGATIVE
BUN SERPL-MCNC: 15 MG/DL (ref 5–25)
CALCIUM SERPL-MCNC: 10.1 MG/DL (ref 8.4–10.2)
CARDIAC TROPONIN I PNL SERPL HS: <2 NG/L
CHLORIDE SERPL-SCNC: 107 MMOL/L (ref 96–108)
CK SERPL-CCNC: 44 U/L (ref 30–135)
CLARITY UR: CLEAR
CO2 SERPL-SCNC: 28 MMOL/L (ref 21–32)
COLOR UR: YELLOW
CREAT SERPL-MCNC: 0.68 MG/DL (ref 0.6–1.2)
EOSINOPHIL # BLD AUTO: 0.06 THOUSAND/ÂΜL (ref 0–0.61)
EOSINOPHIL NFR BLD AUTO: 1 % (ref 0–6)
ERYTHROCYTE [DISTWIDTH] IN BLOOD BY AUTOMATED COUNT: 13.6 % (ref 11.6–15.1)
FLUAV RNA RESP QL NAA+PROBE: NEGATIVE
FLUBV RNA RESP QL NAA+PROBE: NEGATIVE
GFR SERPL CREATININE-BSD FRML MDRD: 97 ML/MIN/1.73SQ M
GLUCOSE SERPL-MCNC: 87 MG/DL (ref 70–99)
GLUCOSE UR STRIP-MCNC: NEGATIVE MG/DL
HCT VFR BLD AUTO: 43.2 % (ref 34.8–46.1)
HGB BLD-MCNC: 14 G/DL (ref 11.5–15.4)
HGB UR QL STRIP.AUTO: 250
IMM GRANULOCYTES # BLD AUTO: 0.02 THOUSAND/UL (ref 0–0.2)
IMM GRANULOCYTES NFR BLD AUTO: 0 % (ref 0–2)
KETONES UR STRIP-MCNC: NEGATIVE MG/DL
LEUKOCYTE ESTERASE UR QL STRIP: NEGATIVE
LYMPHOCYTES # BLD AUTO: 0.36 THOUSANDS/ÂΜL (ref 0.6–4.47)
LYMPHOCYTES NFR BLD AUTO: 6 % (ref 14–44)
MCH RBC QN AUTO: 32 PG (ref 26.8–34.3)
MCHC RBC AUTO-ENTMCNC: 32.4 G/DL (ref 31.4–37.4)
MCV RBC AUTO: 99 FL (ref 82–98)
MONOCYTES # BLD AUTO: 0.4 THOUSAND/ÂΜL (ref 0.17–1.22)
MONOCYTES NFR BLD AUTO: 7 % (ref 4–12)
MUCOUS THREADS UR QL AUTO: ABNORMAL
NEUTROPHILS # BLD AUTO: 5.3 THOUSANDS/ÂΜL (ref 1.85–7.62)
NEUTS SEG NFR BLD AUTO: 86 % (ref 43–75)
NITRITE UR QL STRIP: NEGATIVE
NON-SQ EPI CELLS URNS QL MICRO: ABNORMAL /HPF
NRBC BLD AUTO-RTO: 0 /100 WBCS
P AXIS: 70 DEGREES
PH UR STRIP.AUTO: 6 [PH]
PLATELET # BLD AUTO: 194 THOUSANDS/UL (ref 149–390)
PMV BLD AUTO: 10.2 FL (ref 8.9–12.7)
POTASSIUM SERPL-SCNC: 3.9 MMOL/L (ref 3.5–5.3)
PR INTERVAL: 164 MS
PROT SERPL-MCNC: 7.1 G/DL (ref 6.4–8.4)
PROT UR STRIP-MCNC: ABNORMAL MG/DL
QRS AXIS: 42 DEGREES
QRSD INTERVAL: 76 MS
QT INTERVAL: 314 MS
QTC INTERVAL: 432 MS
RBC # BLD AUTO: 4.38 MILLION/UL (ref 3.81–5.12)
RBC #/AREA URNS AUTO: ABNORMAL /HPF
RSV RNA RESP QL NAA+PROBE: NEGATIVE
SARS-COV-2 RNA RESP QL NAA+PROBE: POSITIVE
SODIUM SERPL-SCNC: 140 MMOL/L (ref 135–147)
SP GR UR STRIP.AUTO: 1.01 (ref 1–1.04)
T WAVE AXIS: 63 DEGREES
TSH SERPL DL<=0.05 MIU/L-ACNC: 0.44 UIU/ML (ref 0.45–4.5)
UROBILINOGEN UA: NEGATIVE MG/DL
VENTRICULAR RATE: 114 BPM
WBC # BLD AUTO: 6.16 THOUSAND/UL (ref 4.31–10.16)
WBC #/AREA URNS AUTO: ABNORMAL /HPF

## 2022-11-21 RX ORDER — METOCLOPRAMIDE HYDROCHLORIDE 5 MG/ML
10 INJECTION INTRAMUSCULAR; INTRAVENOUS ONCE
Status: COMPLETED | OUTPATIENT
Start: 2022-11-21 | End: 2022-11-21

## 2022-11-21 RX ORDER — DIPHENHYDRAMINE HYDROCHLORIDE 50 MG/ML
25 INJECTION INTRAMUSCULAR; INTRAVENOUS ONCE
Status: COMPLETED | OUTPATIENT
Start: 2022-11-21 | End: 2022-11-21

## 2022-11-21 RX ORDER — KETOROLAC TROMETHAMINE 30 MG/ML
15 INJECTION, SOLUTION INTRAMUSCULAR; INTRAVENOUS ONCE
Status: COMPLETED | OUTPATIENT
Start: 2022-11-21 | End: 2022-11-21

## 2022-11-21 RX ORDER — LORAZEPAM 1 MG/1
1 TABLET ORAL ONCE
Status: COMPLETED | OUTPATIENT
Start: 2022-11-21 | End: 2022-11-21

## 2022-11-21 RX ADMIN — SODIUM CHLORIDE 1000 ML: 0.9 INJECTION, SOLUTION INTRAVENOUS at 15:58

## 2022-11-21 RX ADMIN — DIPHENHYDRAMINE HYDROCHLORIDE 25 MG: 50 INJECTION, SOLUTION INTRAMUSCULAR; INTRAVENOUS at 15:59

## 2022-11-21 RX ADMIN — LORAZEPAM 1 MG: 1 TABLET ORAL at 16:04

## 2022-11-21 RX ADMIN — METOCLOPRAMIDE 10 MG: 5 INJECTION, SOLUTION INTRAMUSCULAR; INTRAVENOUS at 16:01

## 2022-11-21 RX ADMIN — KETOROLAC TROMETHAMINE 15 MG: 30 INJECTION, SOLUTION INTRAMUSCULAR; INTRAVENOUS at 16:00

## 2022-11-21 NOTE — ED PROVIDER NOTES
History  Chief Complaint   Patient presents with   • Medical Problem     Pt arrives with multiple complaints  Pt states symptoms started yesterday  Pt reports feeling weakness bilaterally (no gait dysfunction and  strong bilaterally), upper back pain between shoulder blades, tension/nervousness, and headache  No flu or covid vaccine  • Anxiety     Pt reports anxiety  61 yo female with a history of anxiety and chronic back pain presents to the ED with multiple vague complaints including generalized weakness, upper back pain, global "body tension", and a headache x 1 day  The patient says she feels like "something is just not right"  (+) Frontal "throbbing" headache  No visual disturbance  No neck pain or stiffness  She denies chest pain, shortness of breath, and cough  No numbness, speech difficulty, or facial asymmetry  No fevers or chills  No nausea, vomiting, diarrhea, or abdominal pain  She denies dysuria/hematuria  No identifiable sick contacts  No other specific complaints  Prior to Admission Medications   Prescriptions Last Dose Informant Patient Reported? Taking?    ALPRAZolam (XANAX) 0 5 mg tablet   Yes No   Patient not taking: No sig reported   acetaminophen (TYLENOL) 650 mg CR tablet   No No   Sig: Take 1 tablet (650 mg total) by mouth every 12 (twelve) hours   carbamide peroxide (DEBROX) 6 5 % otic solution   No No   Sig: Administer 5 drops into both ears 2 (two) times a day   Patient not taking: No sig reported   clonazePAM (KlonoPIN) 1 mg tablet   Yes No   Sig: take 1 tablet as needed   Patient not taking: No sig reported   dexamethasone (DECADRON) 1 mg tablet   No No   Sig: Take one pill at 11pm the night prior to morning blood testing   fluticasone (FLONASE) 50 mcg/act nasal spray   No No   Si spray into each nostril daily   Patient not taking: No sig reported   hydrOXYzine HCL (ATARAX) 25 mg tablet   No No   Sig: Take 1 tablet (25 mg total) by mouth every 6 (six) hours as needed for itching   Patient not taking: No sig reported   ibuprofen (MOTRIN) 400 mg tablet   No No   Sig: Take 1 tablet (400 mg total) by mouth every 6 (six) hours as needed for mild pain or moderate pain   meloxicam (Mobic) 15 mg tablet   No No   Sig: Take 1 tablet (15 mg total) by mouth in the morning  Patient not taking: No sig reported   nicotine polacrilex (NICORETTE) 4 mg gum   No No   Sig: Chew 1 each (4 mg total) as needed for smoking cessation      Facility-Administered Medications: None       Past Medical History:   Diagnosis Date   • Anxiety    • Psychiatric disorder        Past Surgical History:   Procedure Laterality Date   • BREAST SURGERY     •  SECTION     • CYSTOSCOPY  2022    [facility]   • REDUCTION MAMMAPLASTY Bilateral        • TUBAL LIGATION         Family History   Problem Relation Age of Onset   • No Known Problems Father    • No Known Problems Mother    • No Known Problems Paternal Aunt    • No Known Problems Paternal Aunt    • No Known Problems Paternal Aunt    • No Known Problems Paternal Uncle    • No Known Problems Paternal Uncle    • No Known Problems Paternal Uncle    • No Known Problems Paternal Uncle    • Diabetes Paternal Grandmother    • Stroke Paternal Grandmother    • Diabetes Paternal Grandfather    • No Known Problems Maternal Grandmother    • No Known Problems Maternal Grandfather    • No Known Problems Sister    • No Known Problems Daughter    • No Known Problems Half-Sister      I have reviewed and agree with the history as documented      E-Cigarette/Vaping   • E-Cigarette Use Never User      E-Cigarette/Vaping Substances     Social History     Tobacco Use   • Smoking status: Every Day     Packs/day:  00     Types: Cigarettes   • Smokeless tobacco: Never   Vaping Use   • Vaping Use: Never used   Substance Use Topics   • Alcohol use: Yes     Comment: ocassionally   • Drug use: Yes     Types: Marijuana     Comment: ocassionally       Review of Systems Constitutional: Negative for chills and fever  HENT: Negative for sore throat  Eyes: Negative for visual disturbance  Respiratory: Negative for shortness of breath  Cardiovascular: Negative for chest pain, palpitations and leg swelling  Gastrointestinal: Negative for abdominal pain, diarrhea, nausea and vomiting  Endocrine: Negative for cold intolerance and heat intolerance  Genitourinary: Negative for dysuria, frequency, hematuria and pelvic pain  Musculoskeletal: Positive for back pain and myalgias  Skin: Negative for rash  Allergic/Immunologic: Negative for immunocompromised state  Neurological: Positive for weakness and headaches  Negative for dizziness, syncope, facial asymmetry, speech difficulty, light-headedness and numbness  Hematological: Negative for adenopathy  Psychiatric/Behavioral: Negative for self-injury  Physical Exam  Physical Exam  Constitutional:       General: She is not in acute distress  Appearance: She is well-developed and well-nourished  HENT:      Head: Normocephalic and atraumatic  Eyes:      Extraocular Movements: EOM normal       Pupils: Pupils are equal, round, and reactive to light  Neck:      Meningeal: Brudzinski's sign and Kernig's sign absent  Cardiovascular:      Rate and Rhythm: Regular rhythm  Tachycardia present  Pulmonary:      Effort: Pulmonary effort is normal       Breath sounds: Normal breath sounds  Abdominal:      General: There is no distension  Palpations: Abdomen is soft  Tenderness: There is no abdominal tenderness  Musculoskeletal:         General: No edema  Normal range of motion  Cervical back: Normal range of motion and neck supple  No rigidity  Skin:     General: Skin is warm and dry  Neurological:      Mental Status: She is alert and oriented to person, place, and time     Psychiatric:         Mood and Affect: Mood and affect normal          Vital Signs  ED Triage Vitals   Temperature Pulse Respirations Blood Pressure SpO2   11/21/22 1532 11/21/22 1532 11/21/22 1532 11/21/22 1532 11/21/22 1532   99 9 °F (37 7 °C) (!) 129 20 138/99 98 %      Temp Source Heart Rate Source Patient Position - Orthostatic VS BP Location FiO2 (%)   11/21/22 1532 11/21/22 1532 11/21/22 1532 11/21/22 1532 --   Oral Monitor Sitting Left arm       Pain Score       11/21/22 1724       No Pain           Vitals:    11/21/22 1532 11/21/22 1725   BP: 138/99    Pulse: (!) 129 105   Patient Position - Orthostatic VS: Sitting          Visual Acuity      ED Medications  Medications   sodium chloride 0 9 % bolus 1,000 mL (0 mL Intravenous Stopped 11/21/22 1658)   ketorolac (TORADOL) injection 15 mg (15 mg Intravenous Given 11/21/22 1600)   metoclopramide (REGLAN) injection 10 mg (10 mg Intravenous Given 11/21/22 1601)   diphenhydrAMINE (BENADRYL) injection 25 mg (25 mg Intravenous Given 11/21/22 1559)   LORazepam (ATIVAN) tablet 1 mg (1 mg Oral Given 11/21/22 1604)       Diagnostic Studies  Results Reviewed     Procedure Component Value Units Date/Time    TSH [[id number]]  (Abnormal) Collected: 11/21/22 1558    Lab Status: Final result Specimen: Blood from Arm, Left Updated: 11/21/22 1700     TSH 3RD GENERATON 0 444 uIU/mL     Narrative:      Patients undergoing fluorescein dye angiography may retain small amounts of fluorescein in the body for 48-72 hours post procedure  Samples containing fluorescein can produce falsely depressed TSH values  If the patient had this procedure,a specimen should be resubmitted post fluorescein clearance        Urine Microscopic [[id number]]  (Abnormal) Collected: 11/21/22 1631    Lab Status: Final result Specimen: Urine, Clean Catch Updated: 11/21/22 1655     RBC, UA 10-20 /hpf      WBC, UA None Seen /hpf      Epithelial Cells Occasional /hpf      Bacteria, UA Occasional /hpf      MUCUS THREADS Occasional    FLU/RSV/COVID - if FLU/RSV clinically relevant [[id number]]  (Abnormal) Collected: 11/21/22 1558 Lab Status: Final result Specimen: Nares from Nose Updated: 11/21/22 1651     SARS-CoV-2 Positive     INFLUENZA A PCR Negative     INFLUENZA B PCR Negative     RSV PCR Negative    Narrative:      FOR PEDIATRIC PATIENTS - copy/paste COVID Guidelines URL to browser: [email or web address]    SARS-CoV-2 assay is a Nucleic Acid Amplification assay intended for the  qualitative detection of nucleic acid from SARS-CoV-2 in nasopharyngeal  swabs  Results are for the presumptive identification of SARS-CoV-2 RNA  Positive results are indicative of infection with SARS-CoV-2, the virus  causing COVID-19, but do not rule out bacterial infection or co-infection  with other viruses  Laboratories within the [place] and its  territories are required to report all positive results to the appropriate  public health authorities  Negative results do not preclude SARS-CoV-2  infection and should not be used as the sole basis for treatment or other  patient management decisions  Negative results must be combined with  clinical observations, patient history, and epidemiological information  This test has not been FDA cleared or approved  This test has been authorized by FDA under an Emergency Use Authorization  (EUA)  This test is only authorized for the duration of time the  declaration that circumstances exist justifying the authorization of the  emergency use of an in vitro diagnostic tests for detection of SARS-CoV-2  virus and/or diagnosis of COVID-19 infection under section 564(b)(1) of  the Act, 21 U  S C  514JTJ-9(B)(2), unless the authorization is terminated  or revoked sooner  The test has been validated but independent review by FDA  and CLIA is pending  Test performed using [employer] GeneXpert: This RT-PCR assay targets N2,  a region unique to SARS-CoV-2   A conserved region in the E-gene was chosen  for pan-Sarbecovirus detection which includes SARS-CoV-2  According to CMS-2020-01-R, this platform meets the definition of high-throughput technology      UA w Reflex to Microscopic w Reflex to Culture [[id number]]  (Abnormal) Collected: 11/21/22 1631    Lab Status: Final result Specimen: Urine, Clean Catch Updated: 11/21/22 1649     Color, UA Yellow     Clarity, UA Clear     Specific Gravity, UA 1 015     pH, UA 6 0     Leukocytes, UA Negative     Nitrite, UA Negative     Protein, UA 30 (1+) mg/dl      Glucose, UA Negative mg/dl      Ketones, UA Negative mg/dl      Bilirubin, UA Negative     Occult Blood,  0     UROBILINOGEN UA Negative mg/dL     HS Troponin 0hr (reflex protocol) [[id number]]  (Normal) Collected: 11/21/22 1558    Lab Status: Final result Specimen: Blood from Arm, Left Updated: 11/21/22 1633     hs TnI 0hr <2 ng/L     CK (with reflex to MB) [[id number]]  (Normal) Collected: 11/21/22 1558    Lab Status: Final result Specimen: Blood from Arm, Left Updated: 11/21/22 1625     Total CK 44 U/L     Comprehensive metabolic panel [[id number]] Collected: 11/21/22 1558    Lab Status: Final result Specimen: Blood from Arm, Left Updated: 11/21/22 1625     Sodium 140 mmol/L      Potassium 3 9 mmol/L      Chloride 107 mmol/L      CO2 28 mmol/L      ANION GAP 5 mmol/L      BUN 15 mg/dL      Creatinine 0 68 mg/dL      Glucose 87 mg/dL      Calcium 10 1 mg/dL      AST 27 U/L      ALT 19 U/L      Alkaline Phosphatase 108 U/L      Total Protein 7 1 g/dL      Albumin 3 9 g/dL      Total Bilirubin 0 42 mg/dL      eGFR 97 ml/min/1 73sq m     Narrative:      [place]asir guidelines for Chronic Kidney Disease (CKD):   •  Stage 1 with normal or high GFR (GFR > 90 mL/min/1 73 square meters)  •  Stage 2 Mild CKD (GFR = 60-89 mL/min/1 73 square meters)  •  Stage 3A Moderate CKD (GFR = 45-59 mL/min/1 73 square meters)  •  Stage 3B Moderate CKD (GFR = 30-44 mL/min/1 73 square meters)  •  Stage 4 Severe CKD (GFR = 15-29 mL/min/1 73 square meters)  • Stage 5 End Stage CKD (GFR <15 mL/min/1 73 square meters)  Note: GFR calculation is accurate only with a steady state creatinine    CBC and differential [[id number]]  (Abnormal) Collected: 11/21/22 1558    Lab Status: Final result Specimen: Blood from Arm, Left Updated: 11/21/22 1612     WBC 6 16 Thousand/uL      RBC 4 38 Million/uL      Hemoglobin 14 0 g/dL      Hematocrit 43 2 %      MCV 99 fL      MCH 32 0 pg      MCHC 32 4 g/dL      RDW 13 6 %      MPV 10 2 fL      Platelets 646 Thousands/uL      nRBC 0 /100 WBCs      Neutrophils Relative 86 %      Immat GRANS % 0 %      Lymphocytes Relative 6 %      Monocytes Relative 7 %      Eosinophils Relative 1 %      Basophils Relative 0 %      Neutrophils Absolute 5 30 Thousands/µL      Immature Grans Absolute 0 02 Thousand/uL      Lymphocytes Absolute 0 36 Thousands/µL      Monocytes Absolute 0 40 Thousand/µL      Eosinophils Absolute 0 06 Thousand/µL      Basophils Absolute 0 02 Thousands/µL                  XR chest 2 views   Final Result by Nilsa Joy MD (11/21 1616)      No acute cardiopulmonary disease                    Workstation performed: [id number]                    Procedures  ECG 12 Lead Documentation Only    Date/Time: 11/21/2022 3:47 PM  Performed by: Nadege Ortega MD  Authorized by: Nadege Ortega MD     Indications / Diagnosis:  Weakness  ECG reviewed by me, the ED Provider: yes    Patient location:  ED  Interpretation:     Interpretation: abnormal    Rate:     ECG rate:  114 bpm    ECG rate assessment: tachycardic    Rhythm:     Rhythm: sinus tachycardia    Ectopy:     Ectopy: none    QRS:     QRS axis:  Normal    QRS intervals:  Normal  Conduction:     Conduction: normal    ST segments:     ST segments:  Non-specific  T waves:     T waves: non-specific    Other findings:     Other findings: LAE and LVH               ED Course                               SBIRT 20yo+    Flowsheet Row Most Recent Value   SBIRT (25 yo +)    In order to provide better care to our patients, we are screening all of our patients for alcohol and drug use  Would it be okay to ask you these screening questions? No Filed at: 11/21/2022 1537                    MDM  Number of Diagnoses or Management Options  COVID-19  Viral syndrome  Diagnosis management comments: The patient is comfortable appearing with a benign physical exam  Vital signs are remarkable for a moderate tachycardia  Unclear etiology of the patient's vague complaints  Will check EKG, CXR, basic labs, troponin, UA, TSH, and CPK  IVFs, Reglan, and Toradol ordered  Will continue to monitor in the ED     1720 The patient tested positive for COVID-19, likely explaining her symptoms  Remainder of workup unremarkable  Results discussed with the patient  She says she feels "much better" and is requesting discharge  Plan for supportive care, quarantine, and close follow up with her PCP later this week  The patient is agreeable to this plan  Strict return precautions provided  Amount and/or Complexity of Data Reviewed  Clinical lab tests: ordered and reviewed  Tests in the radiology section of CPT®: ordered and reviewed  Tests in the medicine section of CPT®: ordered and reviewed    Patient Progress  Patient progress: improved      Disposition  Final diagnoses:   Viral syndrome   COVID-19     Time reflects when diagnosis was documented in both MDM as applicable and the Disposition within this note     Time User Action Codes Description Comment    11/21/2022  5:20 PM Jamar Harrison [B34 [id number]] Viral syndrome     11/21/2022  5:20 PM Diana Parikh [U07 1] COVID-19       ED Disposition     ED Disposition   Discharge    Condition   Stable    Date/Time   Mon Nov 21, 2022  5:20 PM    Comment   Judson Sanabria discharge to home/self care                 Follow-up Information     Follow up With Specialties Details Why Contact Info Additional [address] [facility] Schedule an appointment as soon as possible for a visit   [address], [address] [facility] [id number]  [address], [address], [address] [place], [place], [address]          Discharge Medication List as of 11/21/2022  5:20 PM      CONTINUE these medications which have NOT CHANGED    Details   nicotine polacrilex (NICORETTE) 4 mg gum Chew 1 each (4 mg total) as needed for smoking cessation, Starting Thu 9/29/2022, Normal      acetaminophen (TYLENOL) 650 mg CR tablet Take 1 tablet (650 mg total) by mouth every 12 (twelve) hours, Starting Fri 1/7/2022, Normal      ALPRAZolam (XANAX) 0 5 mg tablet Historical Med      carbamide peroxide (DEBROX) 6 5 % otic solution Administer 5 drops into both ears 2 (two) times a day, Starting Thu 10/7/2021, Normal      clonazePAM (KlonoPIN) 1 mg tablet take 1 tablet as needed, Historical Med      dexamethasone (DECADRON) 1 mg tablet Take one pill at 11pm the night prior to morning blood testing, Normal      fluticasone (FLONASE) 50 mcg/act nasal spray 1 spray into each nostril daily, Starting Wed 1/1/2020, Print      hydrOXYzine HCL (ATARAX) 25 mg tablet Take 1 tablet (25 mg total) by mouth every 6 (six) hours as needed for itching, Starting Tue 2/8/2022, Normal      ibuprofen (MOTRIN) 400 mg tablet Take 1 tablet (400 mg total) by mouth every 6 (six) hours as needed for mild pain or moderate pain, Starting Tue 2/8/2022, Normal      meloxicam (Mobic) 15 mg tablet Take 1 tablet (15 mg total) by mouth in the morning , Starting Mon 5/16/2022, Normal             No discharge procedures on file      PDMP Review     None          ED Provider  Electronically Signed by           Hunter Woods MD  11/22/22 7552

## 2022-11-28 ENCOUNTER — OFFICE VISIT (OUTPATIENT)
Dept: FAMILY MEDICINE CLINIC | Facility: CLINIC | Age: 57
End: 2022-11-28

## 2022-11-28 ENCOUNTER — PATIENT OUTREACH (OUTPATIENT)
Dept: FAMILY MEDICINE CLINIC | Facility: CLINIC | Age: 57
End: 2022-11-28

## 2022-11-28 VITALS
TEMPERATURE: 97.7 F | BODY MASS INDEX: 27.66 KG/M2 | RESPIRATION RATE: 16 BRPM | SYSTOLIC BLOOD PRESSURE: 122 MMHG | WEIGHT: 162 LBS | DIASTOLIC BLOOD PRESSURE: 76 MMHG | HEIGHT: 64 IN | HEART RATE: 91 BPM | OXYGEN SATURATION: 98 %

## 2022-11-28 DIAGNOSIS — Z91.410: Primary | ICD-10-CM

## 2022-11-28 PROBLEM — IMO0002: Status: ACTIVE | Noted: 2022-11-28
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
## 2022-11-29 NOTE — ASSESSMENT & PLAN NOTE
· Gave patient [facility] telephone number to call them  She was informed about their work and to not disclose number to   · I personally contacted our SW to talk with patient today while in the clinic and offer resources to her (separate note); a referral was sent  · She was advised about the importance of having a shelter and what to do in case of emergency

## 2022-12-01 ENCOUNTER — ANNUAL EXAM (OUTPATIENT)
Dept: OBGYN CLINIC | Facility: CLINIC | Age: 57
End: 2022-12-01

## 2022-12-01 VITALS
HEART RATE: 76 BPM | DIASTOLIC BLOOD PRESSURE: 81 MMHG | SYSTOLIC BLOOD PRESSURE: 125 MMHG | BODY MASS INDEX: 27.49 KG/M2 | WEIGHT: 161 LBS | HEIGHT: 64 IN

## 2022-12-01 DIAGNOSIS — Z01.419 ENCOUNTER FOR ANNUAL ROUTINE GYNECOLOGICAL EXAMINATION: Primary | ICD-10-CM

## 2022-12-01 NOTE — PATIENT INSTRUCTIONS
Call with needs or concerns  Keep 6 months L mammogram and L breast U/S appointment F/U  Return in 1 year    COVID-19 Instructions    If you are having any of the following:  Cough   Shortness of breath   Fever  If traveled within past 2 weeks internationally or to high risk [place] states  Or been in contact with someone that has     Please call either:   Your PCP office  [phone], option [phone]    They will screen you over the phone and direct you to the nearest appropriate testing location    DO NOT go to your PCP or OB office without calling first       Idania Park
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
## 2023-01-03 ENCOUNTER — OFFICE VISIT (OUTPATIENT)
Dept: FAMILY MEDICINE CLINIC | Facility: CLINIC | Age: 58
End: 2023-01-03

## 2023-01-03 ENCOUNTER — APPOINTMENT (OUTPATIENT)
Dept: LAB | Facility: CLINIC | Age: 58
End: 2023-01-03

## 2023-01-03 VITALS
HEIGHT: 64 IN | HEART RATE: 87 BPM | DIASTOLIC BLOOD PRESSURE: 86 MMHG | TEMPERATURE: 97.6 F | OXYGEN SATURATION: 97 % | SYSTOLIC BLOOD PRESSURE: 146 MMHG | BODY MASS INDEX: 28.58 KG/M2 | WEIGHT: 167.4 LBS | RESPIRATION RATE: 18 BRPM

## 2023-01-03 DIAGNOSIS — R79.89 ABNORMAL TSH: Primary | ICD-10-CM

## 2023-01-03 DIAGNOSIS — H61.23 BILATERAL IMPACTED CERUMEN: ICD-10-CM

## 2023-01-03 DIAGNOSIS — D35.02 ADENOMA OF LEFT ADRENAL GLAND: ICD-10-CM

## 2023-01-03 DIAGNOSIS — Z72.0 TOBACCO ABUSE: ICD-10-CM

## 2023-01-03 DIAGNOSIS — R79.89 ABNORMAL TSH: ICD-10-CM

## 2023-01-03 DIAGNOSIS — Z91.410: ICD-10-CM

## 2023-01-03 DIAGNOSIS — M25.562 CHRONIC PAIN OF BOTH KNEES: ICD-10-CM

## 2023-01-03 DIAGNOSIS — G89.29 CHRONIC PAIN OF BOTH KNEES: ICD-10-CM

## 2023-01-03 DIAGNOSIS — M54.50 ACUTE LEFT-SIDED LOW BACK PAIN WITHOUT SCIATICA: ICD-10-CM

## 2023-01-03 DIAGNOSIS — R03.0 ELEVATED BP WITHOUT DIAGNOSIS OF HYPERTENSION: ICD-10-CM

## 2023-01-03 DIAGNOSIS — M25.561 CHRONIC PAIN OF BOTH KNEES: ICD-10-CM

## 2023-01-03 DIAGNOSIS — M54.50 ACUTE LEFT-SIDED LOW BACK PAIN WITHOUT SCIATICA: Primary | ICD-10-CM

## 2023-01-03 RX ORDER — NICOTINE 21 MG/24HR
1 PATCH, TRANSDERMAL 24 HOURS TRANSDERMAL EVERY 24 HOURS
Qty: 28 PATCH | Refills: 0 | Status: SHIPPED | OUTPATIENT
Start: 2023-01-03
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
## 2023-01-03 NOTE — ASSESSMENT & PLAN NOTE
Incidental 1 9 cm left adrenal adenoma on 9/7/22     - - D/w radiology who don't recommend followup imaging on these size adrenal adenomas as long as the patient is asymptomatic  A 1 year follow up CT scan (non contrast) can be helpful to document stability  Discussed today with patient and she is not sure yet if she wants it  Barrier being uncertain  - Will discuss again at next appt  If she wishes to decline, documentation of comprehension along with rationalization will be made  Should Sheng Crandall wish for s kathyaan then aim for Sept 23'

## 2023-01-03 NOTE — ASSESSMENT & PLAN NOTE
TSH 0 444  Never had thyroid issues  Was done at same time of COVID-19 infx ?sub-acute    - Will rpt TSH today and f/up with result
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
## 2023-01-03 NOTE — ASSESSMENT & PLAN NOTE
High blood pressure in clinic today, though priors acceptable:  BP Readings from Last 3 Encounters:   01/03/23 146/86   12/01/22 125/81   11/28/22 122/76   Pt states she has 700/312 systolic on home checks most weeks when checked  Asymptomatic    - Will continue to monitor

## 2023-01-04 LAB
ALBUMIN SERPL BCP-MCNC: 3.5 G/DL (ref 3.5–5)
ALP SERPL-CCNC: 119 U/L (ref 46–116)
ALT SERPL W P-5'-P-CCNC: 20 U/L (ref 12–78)
ANION GAP SERPL CALCULATED.3IONS-SCNC: 5 MMOL/L (ref 4–13)
AST SERPL W P-5'-P-CCNC: 15 U/L (ref 5–45)
BILIRUB SERPL-MCNC: 0.5 MG/DL (ref 0.2–1)
BUN SERPL-MCNC: 15 MG/DL (ref 5–25)
CALCIUM SERPL-MCNC: 9.8 MG/DL (ref 8.3–10.1)
CHLORIDE SERPL-SCNC: 111 MMOL/L (ref 96–108)
CHOLEST SERPL-MCNC: 181 MG/DL
CO2 SERPL-SCNC: 27 MMOL/L (ref 21–32)
CREAT SERPL-MCNC: 0.7 MG/DL (ref 0.6–1.3)
GFR SERPL CREATININE-BSD FRML MDRD: 96 ML/MIN/1.73SQ M
GLUCOSE P FAST SERPL-MCNC: 88 MG/DL (ref 65–99)
HDLC SERPL-MCNC: 46 MG/DL
LDLC SERPL CALC-MCNC: 110 MG/DL (ref 0–100)
NONHDLC SERPL-MCNC: 135 MG/DL
POTASSIUM SERPL-SCNC: 3.4 MMOL/L (ref 3.5–5.3)
PROT SERPL-MCNC: 6.9 G/DL (ref 6.4–8.4)
SODIUM SERPL-SCNC: 143 MMOL/L (ref 135–147)
TRIGL SERPL-MCNC: 124 MG/DL
TSH SERPL DL<=0.05 MIU/L-ACNC: 0.86 UIU/ML (ref 0.45–4.5)

## 2023-02-06 ENCOUNTER — TELEPHONE (OUTPATIENT)
Dept: FAMILY MEDICINE CLINIC | Facility: CLINIC | Age: 58
End: 2023-02-06

## 2023-02-06 NOTE — TELEPHONE ENCOUNTER
Ivon, my name is Gina Jaimes  My birthday is September 4, 1965  Phone number [phone]  I was hoping I can get a prescription at [facility]  I'm having a urinary tract infection  It's foggy, a little older  I can have some kind of prescription please  I would appreciate it  My pharmacy is [facility]  You can call me back at [phone]  Thank you very much  Bye     I CALLED THE PT AND LET HER KNOW SHE MAY NEED TO COME IN FOR A APT BUT SHE STATED SHE IS IN [place] RIGHT NOW AND CAN'T COME IN AND SHE WAS JUST HOPING YOU CAN SEND SOMETHING

## 2023-02-09 ENCOUNTER — PATIENT OUTREACH (OUTPATIENT)
Dept: FAMILY MEDICINE CLINIC | Facility: CLINIC | Age: 58
End: 2023-02-09

## 2024-02-07 ENCOUNTER — PATIENT OUTREACH (OUTPATIENT)
Dept: FAMILY MEDICINE CLINIC | Facility: CLINIC | Age: 59
End: 2024-02-07

## 2024-02-07 DIAGNOSIS — Z76.89 ENCOUNTER FOR SOCIAL WORK INTERVENTION: Primary | ICD-10-CM
